# Patient Record
Sex: FEMALE | Race: WHITE | Employment: UNEMPLOYED | URBAN - METROPOLITAN AREA
[De-identification: names, ages, dates, MRNs, and addresses within clinical notes are randomized per-mention and may not be internally consistent; named-entity substitution may affect disease eponyms.]

---

## 2017-06-14 ENCOUNTER — APPOINTMENT (OUTPATIENT)
Dept: GENERAL RADIOLOGY | Age: 42
End: 2017-06-14
Attending: PHYSICIAN ASSISTANT
Payer: MEDICAID

## 2017-06-14 ENCOUNTER — HOSPITAL ENCOUNTER (EMERGENCY)
Age: 42
Discharge: HOME | End: 2017-06-14
Attending: EMERGENCY MEDICINE
Payer: MEDICAID

## 2017-06-14 VITALS
DIASTOLIC BLOOD PRESSURE: 71 MMHG | TEMPERATURE: 98.1 F | SYSTOLIC BLOOD PRESSURE: 131 MMHG | RESPIRATION RATE: 17 BRPM | HEART RATE: 84 BPM | OXYGEN SATURATION: 98 %

## 2017-06-14 VITALS
SYSTOLIC BLOOD PRESSURE: 117 MMHG | HEART RATE: 89 BPM | BODY MASS INDEX: 40.35 KG/M2 | TEMPERATURE: 98.3 F | RESPIRATION RATE: 16 BRPM | OXYGEN SATURATION: 98 % | DIASTOLIC BLOOD PRESSURE: 68 MMHG | WEIGHT: 250 LBS

## 2017-06-14 DIAGNOSIS — G89.4 CHRONIC PAIN SYNDROME: Primary | ICD-10-CM

## 2017-06-14 DIAGNOSIS — G89.29 OTHER CHRONIC PAIN: Primary | ICD-10-CM

## 2017-06-14 PROCEDURE — 74011250636 HC RX REV CODE- 250/636: Performed by: PHYSICIAN ASSISTANT

## 2017-06-14 PROCEDURE — 96372 THER/PROPH/DIAG INJ SC/IM: CPT | Performed by: EMERGENCY MEDICINE

## 2017-06-14 PROCEDURE — 99283 EMERGENCY DEPT VISIT LOW MDM: CPT | Performed by: EMERGENCY MEDICINE

## 2017-06-14 PROCEDURE — 74011250637 HC RX REV CODE- 250/637: Performed by: PHYSICIAN ASSISTANT

## 2017-06-14 RX ORDER — ONDANSETRON 4 MG/1
4 TABLET, ORALLY DISINTEGRATING ORAL
Status: COMPLETED | OUTPATIENT
Start: 2017-06-14 | End: 2017-06-14

## 2017-06-14 RX ORDER — MORPHINE SULFATE 4 MG/ML
8 INJECTION, SOLUTION INTRAMUSCULAR; INTRAVENOUS ONCE
Status: DISCONTINUED | OUTPATIENT
Start: 2017-06-14 | End: 2017-06-14 | Stop reason: SDUPTHER

## 2017-06-14 RX ORDER — MORPHINE SULFATE 4 MG/ML
8 INJECTION, SOLUTION INTRAMUSCULAR; INTRAVENOUS ONCE
Status: COMPLETED | OUTPATIENT
Start: 2017-06-14 | End: 2017-06-14

## 2017-06-14 RX ADMIN — ONDANSETRON 4 MG: 4 TABLET, ORALLY DISINTEGRATING ORAL at 18:01

## 2017-06-14 RX ADMIN — MORPHINE SULFATE 8 MG: 4 INJECTION, SOLUTION INTRAMUSCULAR; INTRAVENOUS at 18:05

## 2017-06-14 NOTE — ED TRIAGE NOTES
Pt reports history of chronic back pain due to her fibromyalgia. Pt reports attempting 10mg Lortab without relief.        Dhruv Rueda RN

## 2017-06-14 NOTE — DISCHARGE INSTRUCTIONS
Chronic Pain: Care Instructions  Your Care Instructions  Chronic pain is pain that lasts a long time (months or even years) and may or may not have a clear cause. It is different from acute pain, which usually does have a clear cause--like an injury or illness--and gets better over time. Chronic pain:  · Lasts over time but may vary from day to day. · Does not go away despite efforts to end it. · May disrupt your sleep and lead to fatigue. · May cause depression or anxiety. · May make your muscles tense, causing more pain. · Can disrupt your work, hobbies, home life, and relationships with friends and family. Chronic pain is a very real condition. It is not just in your head. Treatment can help and usually includes several methods used together, such as medicines, physical therapy, exercise, and other treatments. Learning how to relax and changing negative thought patterns can also help you cope. Chronic pain is complex. Taking an active role in your treatment will help you better manage your pain. Tell your doctor if you have trouble dealing with your pain. You may have to try several things before you find what works best for you. Follow-up care is a key part of your treatment and safety. Be sure to make and go to all appointments, and call your doctor if you are having problems. Its also a good idea to know your test results and keep a list of the medicines you take. How can you care for yourself at home? · Pace yourself. Break up large jobs into smaller tasks. Save harder tasks for days when you have less pain, or go back and forth between hard tasks and easier ones. Take rest breaks. · Relax, and reduce stress. Relaxation techniques such as deep breathing or meditation can help. · Keep moving. Gentle, daily exercise can help reduce pain over the long run. Try low- or no-impact exercises such as walking, swimming, and stationary biking. Do stretches to stay flexible.   · Try heat, cold packs, and massage. · Get enough sleep. Chronic pain can make you tired and drain your energy. Talk with your doctor if you have trouble sleeping because of pain. · Think positive. Your thoughts can affect your pain level. Do things that you enjoy to distract yourself when you have pain instead of focusing on the pain. See a movie, read a book, listen to music, or spend time with a friend. · If you think you are depressed, talk to your doctor about treatment. · Keep a daily pain diary. Record how your moods, thoughts, sleep patterns, activities, and medicine affect your pain. You may find that your pain is worse during or after certain activities or when you are feeling a certain emotion. Having a record of your pain can help you and your doctor find the best ways to treat your pain. · Take pain medicines exactly as directed. ¨ If the doctor gave you a prescription medicine for pain, take it as prescribed. ¨ If you are not taking a prescription pain medicine, ask your doctor if you can take an over-the-counter medicine. Reducing constipation caused by pain medicine  · Include fruits, vegetables, beans, and whole grains in your diet each day. These foods are high in fiber. · Drink plenty of fluids, enough so that your urine is light yellow or clear like water. If you have kidney, heart, or liver disease and have to limit fluids, talk with your doctor before you increase the amount of fluids you drink. · If your doctor recommends it, get more exercise. Walking is a good choice. Bit by bit, increase the amount you walk every day. Try for at least 30 minutes on most days of the week. · Schedule time each day for a bowel movement. A daily routine may help. Take your time and do not strain when having a bowel movement. When should you call for help? Call your doctor now or seek immediate medical care if:  · Your pain gets worse or is out of control.   · You feel down or blue, or you do not enjoy things like you once did. You may be depressed, which is common in people with chronic pain. Depression can be treated. · You have vomiting or cramps for more than 2 hours. Watch closely for changes in your health, and be sure to contact your doctor if:  · You cannot sleep because of pain. · You are very worried or anxious about your pain. · You have trouble taking your pain medicine. · You have any concerns about your pain medicine. · You have trouble with bowel movements, such as:  ¨ No bowel movement in 3 days. ¨ Blood in the anal area, in your stool, or on the toilet paper. ¨ Diarrhea for more than 24 hours. Where can you learn more? Go to http://felicia-hosea.info/. Enter N004 in the search box to learn more about \"Chronic Pain: Care Instructions. \"  Current as of: October 14, 2016  Content Version: 11.2  © 3354-7230 [employer]. Care instructions adapted under license by [employer] (which disclaims liability or warranty for this information). If you have questions about a medical condition or this instruction, always ask your healthcare professional. [place] [address] any warranty or liability for your use of this information. Learning About a Pain Diary  What is a pain diary? When you have pain, you may not be able to sleep. You may have trouble thinking. Pain can make it hard for you to do your day-to-day activities, such as go to work. A pain diary is a written record that helps you keep track of when you have pain, how bad it is, and whether your treatment is helping. Keeping a diary gives you clues about your pain--when it happens, what causes it, and what makes it better or worse. How do you use a pain diary? Use the pain diary to write down the time and date of your pain episodes. The diary also helps you identify the type of pain you are having by using a pain scale. The pain scale starts at 0 and ends at 10.  In this scale, 0 is no pain, and 10 is the worst pain you can imagine. The diary also helps you track:  · Any medicine you are taking for pain and how much. · Side effects of your pain medicine. · Anything you did, ate, or drank that might have made the pain better. · Anything you did, ate, or drank that might have made the pain worse. What are the benefits of using a pain diary? Used over time, a pain diary can help you and your doctor understand the kinds of things that make your pain better or worse. Here are some things to think about while you are using your pain diary:  · Where is the pain located? Is it throbbing, sharp, tingling, shooting, or burning? Is it constant, or does it come and go? · Does the pain change at different times of day? When? · Does the pain get worse before or after meals? · Does the pain get better or worse with activity? What kind of activity? · Does the pain keep you from falling asleep at night? Does pain wake you up in the night? How long do you keep a pain diary? Your doctor will have you track your pain using the diary for a week or more. Then the two of you will meet to talk about what you have written in the diary. Your doctor may adjust pain medicine or suggest other treatments to try. After these changes are made, you may use the pain diary for another week, or longer if your doctor suggests, to track your pain and whether the new treatment is helping. The diary is a tool that can help you and your doctor find out what works best to manage pain. You will use it as long as you both find it helpful. Where can you learn more? Go to http://felicia-hosea.info/. Enter X532 in the search box to learn more about \"Learning About a Pain Diary. \"  Current as of: October 14, 2016  Content Version: 11.2  © 6716-7559 [employer]. Care instructions adapted under license by [employer] (which disclaims liability or warranty for this information).  If you have questions about a medical condition or this instruction, always ask your healthcare professional. [place] [address] any warranty or liability for your use of this information.

## 2017-06-14 NOTE — ED PROVIDER NOTES
HPI Comments: Patient presents to the ER stating \"I hurt all over, its my fibromyalgia flared up\". States pain started increasing yesterday, she only has \"a couple\" Norco 10mg left. Feels exactly like previous fibromyalgia pain- worse in the lower back and into BL shoulders. Patient denies urinary/bowel incontinence or retention, no saddle anesthesia or sensorimotor deficits. Pain hurts worse when she moves, improves with Vaughn.  Denies new trauma. Patient states she is in between pain management doctors right now, has a referral for [facility] but is waiting to get an appointment. Is unsatisfied with her PCP at [facility] because they do not treat her fibromyalgia appropriately. States she has been on methadone and Suboxone in the past.    Recently seen by neurology on 6/7/17 for migraines by Dr. Marie Chery. Patient is a 39 y.o. female presenting with back pain. Back Pain    Pertinent negatives include no chest pain, no fever, no numbness, no abdominal pain, no dysuria and no pelvic pain. Past Medical History:   Diagnosis Date    Asthma     Other ill-defined conditions     fibra myalgia    Psychiatric disorder     depression/anxiety       Past Surgical History:   Procedure Laterality Date    HX GYN      laparoscopy times 4    HX HEENT      jaw         History reviewed. No pertinent family history. Social History     Social History    Marital status: SINGLE     Spouse name: N/A    Number of children: N/A    Years of education: N/A     Occupational History    Not on file. Social History Main Topics    Smoking status: Never Smoker    Smokeless tobacco: Not on file    Alcohol use Yes      Comment: occ    Drug use: Yes     Special: Opiates    Sexual activity: Not on file     Other Topics Concern    Not on file     Social History Narrative         ALLERGIES: Haldol [haloperidol lactate]; Pcn [penicillins]; Toradol [ketorolac tromethamine];  Vioxx [rofecoxib]; and Vistaril [hydroxyzine pamoate]    Review of Systems   Constitutional: Negative for chills, fatigue and fever. Respiratory: Negative for cough and shortness of breath. Cardiovascular: Negative for chest pain and palpitations. Gastrointestinal: Negative for abdominal pain and nausea. Genitourinary: Negative for dysuria and pelvic pain. Musculoskeletal: Positive for arthralgias and back pain. Negative for neck pain and neck stiffness. Skin: Positive for wound. Neurological: Negative for numbness. Vitals:    06/14/17 1538   BP: 121/79   Pulse: 95   Resp: 16   Temp: 98.1 °F (36.7 °C)   SpO2: 98%   Weight: 113.4 kg (250 lb)            Physical Exam   Constitutional: She is oriented to person, place, and time. She appears well-developed and well-nourished. Large white female in no acute distress, laying on her side on stretcher in ER. HENT:   Head: Normocephalic. Eyes: Conjunctivae and EOM are normal. Pupils are equal, round, and reactive to light. Neck: Normal range of motion. Neck supple. No midline tenderness, range of motion normal.  Upper trapezius muscle tenderness to palpation. Pulmonary/Chest: Effort normal and breath sounds normal. No respiratory distress. She has no wheezes. Abdominal: Soft. Bowel sounds are normal. She exhibits no distension. There is no tenderness. There is no rebound. Musculoskeletal: She exhibits tenderness. Thoracic back: She exhibits tenderness and pain. She exhibits normal range of motion, no bony tenderness, no swelling, no edema, no deformity and no laceration. Back:    Refuses SLR test due to pain. Can roll over on stretcher without assistance. Neurological: She is alert and oriented to person, place, and time. She has normal strength and normal reflexes. No cranial nerve deficit or sensory deficit. GCS eye subscore is 4. GCS verbal subscore is 5. GCS motor subscore is 6.    Reflex Scores:       Brachioradialis reflexes are 2+ on the right side and 2+ on the left side. Patellar reflexes are 2+ on the right side and 2+ on the left side. A&O x 3. CN 2-12 grossly intact   Nursing note and vitals reviewed. MDM  Number of Diagnoses or Management Options  Other chronic pain: new and requires workup  Diagnosis management comments: Fibromyalgia, drug-seeking behavior, UTI, chronic pain syndrome       Amount and/or Complexity of Data Reviewed  Clinical lab tests: ordered and reviewed  Tests in the radiology section of CPT®: ordered and reviewed    Risk of Complications, Morbidity, and/or Mortality  Presenting problems: moderate  Diagnostic procedures: moderate  Management options: moderate  General comments: We will assess UTI and lumbar spine for any reason for flareup to fibromyalgia. Patient I will give her pain medicine while in the ER and would not be prescribing any narcotic medication upon discharge. Patient Progress  Patient progress: improved    ED Course       Procedures      The patient was observed in the ED. Results Reviewed:  Pt refuses XL lumbar spine. Patient presents to the ER for pain control of \"flareup\" of fibromyalgia. No recent trauma, fall, no urinary symptoms. Patient will be getting IM morphine, discharged to follow-up with pain management. Reports feeling better. I discussed the results of all labs, procedures, radiographs, and treatments with the patient and available family. Treatment plan is agreed upon and the patient is ready for discharge. All voiced understanding of the discharge plan and medication instructions or changes as appropriate. Questions about treatment in the ED were answered. All were encouraged to return should symptoms worsen or new problems develop.

## 2017-06-14 NOTE — ROUTINE PROCESS
Spoke with patient concerning her complaint about her wait time and explained that the ER was was following their triage protocol and that after looking at the ER board no one had been pulled back from the waiting room that had come in after her.

## 2017-06-14 NOTE — Clinical Note
Ear pain has been treated in the ER today as a courtesy to you. The emergency room cannot appropriately treat your chronic pain. Urinalysis was unremarkable. Follow-up with your pain management clinic or family doctor as soon as able. Gentle exercis es and moving your body can aid in self management of chronic pain. Follow-up with mental health to discuss long-term coping mechanisms with your chronic pain. Return to the ER if worsening or new symptoms.

## 2017-06-15 NOTE — ED NOTES
Pt returns to ED for continued back pain/fibromyalgia. Pt states she will \"call \" if she does not receive something else for pain and see a physician. Informed patient she would have to wait to see physician in lobby as she had already been discharged.       aHny Jacob RN

## 2017-06-15 NOTE — DISCHARGE INSTRUCTIONS

## 2017-06-15 NOTE — ED PROVIDER NOTES
HPI Comments: 70-year-old female with history of fibromyalgia and chronic pain syndrome presents requesting pain medication. She states she has fibromyalgia flareup and has pain getting out of a chair. She was seen in the emergency department earlier today and get a shot of morphine. She is frustrated that she did not get a strong prescription for pain. She had refused x-ray of her back at that time. She has been on  Subutex therapy for at least one year, but states that she stopped last month because she needed  Norco to treat her \"tinnitus. \"  When questioned further about that \"tinnitus\" she states it is pain throughout her jaw and face and this was diagnosed by oral surgeon. I questioned whether she was referring to trigeminal neuralgia, but she continues to insist that she needs Norco for her tinnitus pain, but the Suboxone helped her fibromyalgia pain, and she was unable to take both. She now states that she is \"in between doctors\" and needs something else for pain. She continues to list other painful conditions such as a prior broken jaw, pelvic pain from D&C, etc.    The history is provided by the patient. Past Medical History:   Diagnosis Date    Asthma     Other ill-defined conditions     fibra myalgia    Psychiatric disorder     depression/anxiety       Past Surgical History:   Procedure Laterality Date    HX GYN      laparoscopy times 4    HX HEENT      jaw         History reviewed. No pertinent family history. Social History     Social History    Marital status: SINGLE     Spouse name: N/A    Number of children: N/A    Years of education: N/A     Occupational History    Not on file.      Social History Main Topics    Smoking status: Never Smoker    Smokeless tobacco: Not on file    Alcohol use Yes      Comment: occ    Drug use: Yes     Special: Opiates    Sexual activity: Not on file     Other Topics Concern    Not on file     Social History Narrative         ALLERGIES: Haldol [haloperidol lactate]; Pcn [penicillins]; Toradol [ketorolac tromethamine]; Vioxx [rofecoxib]; and Vistaril [hydroxyzine pamoate]    Review of Systems   Constitutional: Negative for fever. Musculoskeletal: Positive for arthralgias, back pain and myalgias. Negative for joint swelling. Skin: Negative for wound. Psychiatric/Behavioral: Negative for confusion. Vitals:    06/14/17 2030 06/14/17 2323   BP: 124/79 131/71   Pulse: 90 84   Resp: 16 17   Temp: 98.4 °F (36.9 °C) 98.1 °F (36.7 °C)   SpO2: 98% 98%            Physical Exam   Constitutional: She appears well-developed and well-nourished. No distress. HENT:   Head: Normocephalic and atraumatic. Eyes: Conjunctivae are normal. Pupils are equal, round, and reactive to light. Neck: Neck supple. Musculoskeletal: Normal range of motion. Neurological: She is alert. She exhibits normal muscle tone. Skin: Skin is warm and dry. Psychiatric: Her speech is slurred. She is slowed. Nursing note and vitals reviewed. MDM  Number of Diagnoses or Management Options  Chronic pain syndrome:   Diagnosis management comments: Parts of this document were created using dragon voice recognition software. The chart has been reviewed but errors may still be present. Slumped over in wheelchair upon my entering room. Appears sleepy. Continually itches her nose. Controlled substance report reveals 40 prescriptions from 7 prescribers and 5 pharmacies over the past yeasr. Most all prescriptions until March are for buprenorphine with  3 intermixed oxycodone/hydrocodone prescriptions. Most recently, she has had  3 narcotic prescriptions since April from various providers. Discussed that we could not provide her any further narcotic treatment for chronic pain. She has no new acute painful conditions. She does not appear to be in any significant pain. Vital signs stable. Patient became very frustrated stating she was going to librado us.   Patient escorted to the waiting room.     ED Course       Procedures

## 2019-05-23 PROBLEM — G89.4 CHRONIC PAIN SYNDROME: Status: ACTIVE | Noted: 2019-05-23

## 2019-05-23 PROBLEM — F41.9 ANXIETY: Status: ACTIVE | Noted: 2019-05-23

## 2019-05-23 PROBLEM — E66.9 OBESITY, UNSPECIFIED OBESITY SEVERITY, UNSPECIFIED OBESITY TYPE: Status: ACTIVE | Noted: 2019-05-23

## 2019-05-23 PROBLEM — F25.1 SCHIZOAFFECTIVE DISORDER, DEPRESSIVE TYPE (HCC): Status: ACTIVE | Noted: 2017-06-22

## 2019-05-23 PROBLEM — Z71.3 DIETARY COUNSELING: Status: ACTIVE | Noted: 2019-05-23

## 2019-05-23 PROBLEM — Z13.228 SCREENING FOR METABOLIC DISORDER: Status: ACTIVE | Noted: 2019-05-23

## 2019-05-23 PROBLEM — M79.7 FIBROMYALGIA: Status: ACTIVE | Noted: 2019-05-23

## 2019-05-23 PROBLEM — F99 PSYCHIATRIC DISORDER: Status: ACTIVE | Noted: 2019-05-23

## 2019-05-23 PROBLEM — Z11.3 SCREENING FOR STD (SEXUALLY TRANSMITTED DISEASE): Status: ACTIVE | Noted: 2019-05-23

## 2019-06-03 PROBLEM — Z12.31 SCREENING MAMMOGRAM, ENCOUNTER FOR: Status: ACTIVE | Noted: 2019-06-03

## 2019-06-03 PROBLEM — Z23 ENCOUNTER FOR IMMUNIZATION: Status: ACTIVE | Noted: 2019-06-03

## 2019-06-03 PROBLEM — Z00.00 PHYSICAL EXAM: Status: ACTIVE | Noted: 2019-06-03

## 2019-06-03 PROBLEM — E66.01 OBESITY, MORBID (HCC): Status: ACTIVE | Noted: 2019-06-03

## 2019-07-05 ENCOUNTER — HOSPITAL ENCOUNTER (OUTPATIENT)
Dept: MAMMOGRAPHY | Age: 44
Discharge: HOME | End: 2019-07-05
Attending: FAMILY MEDICINE

## 2019-07-05 DIAGNOSIS — Z12.31 SCREENING MAMMOGRAM, ENCOUNTER FOR: ICD-10-CM

## 2019-07-22 ENCOUNTER — HOSPITAL ENCOUNTER (OUTPATIENT)
Dept: MAMMOGRAPHY | Age: 44
Discharge: HOME | End: 2019-07-22
Attending: FAMILY MEDICINE
Payer: COMMERCIAL

## 2019-07-22 DIAGNOSIS — R92.8 ABNORMAL SCREENING MAMMOGRAM: ICD-10-CM

## 2019-07-22 PROCEDURE — 76642 ULTRASOUND BREAST LIMITED: CPT

## 2019-07-22 PROCEDURE — 77065 DX MAMMO INCL CAD UNI: CPT

## 2019-08-14 PROBLEM — E78.1 HYPERTRIGLYCERIDEMIA: Status: ACTIVE | Noted: 2019-08-14

## 2019-08-14 PROBLEM — Z86.19 HISTORY OF HEPATITIS C VIRUS INFECTION: Status: ACTIVE | Noted: 2019-08-14

## 2019-08-14 PROBLEM — B19.20 HEPATITIS C VIRUS INFECTION WITHOUT HEPATIC COMA: Status: ACTIVE | Noted: 2019-08-14

## 2019-08-14 PROBLEM — R03.0 ELEVATED BP WITHOUT DIAGNOSIS OF HYPERTENSION: Status: ACTIVE | Noted: 2019-08-14

## 2019-08-14 PROBLEM — E78.5 HYPERLIPIDEMIA: Status: ACTIVE | Noted: 2019-08-14

## 2019-09-20 PROBLEM — Z12.31 SCREENING MAMMOGRAM, ENCOUNTER FOR: Status: RESOLVED | Noted: 2019-06-03 | Resolved: 2019-09-20

## 2019-09-20 PROBLEM — Z23 ENCOUNTER FOR IMMUNIZATION: Status: RESOLVED | Noted: 2019-06-03 | Resolved: 2019-09-20

## 2020-03-20 ENCOUNTER — HOSPITAL ENCOUNTER (OUTPATIENT)
Dept: LAB | Age: 45
Discharge: HOME | End: 2020-03-20
Payer: COMMERCIAL

## 2020-03-20 DIAGNOSIS — E78.1 HYPERTRIGLYCERIDEMIA: ICD-10-CM

## 2020-03-20 DIAGNOSIS — M54.41 CHRONIC RIGHT-SIDED LOW BACK PAIN WITH RIGHT-SIDED SCIATICA: ICD-10-CM

## 2020-03-20 DIAGNOSIS — R73.9 ELEVATED BLOOD SUGAR: ICD-10-CM

## 2020-03-20 DIAGNOSIS — E78.5 HYPERLIPIDEMIA, UNSPECIFIED HYPERLIPIDEMIA TYPE: ICD-10-CM

## 2020-03-20 DIAGNOSIS — G89.29 CHRONIC RIGHT-SIDED LOW BACK PAIN WITH RIGHT-SIDED SCIATICA: ICD-10-CM

## 2020-03-20 DIAGNOSIS — M79.651 RIGHT THIGH PAIN: ICD-10-CM

## 2020-03-20 LAB
ALBUMIN SERPL-MCNC: 3 G/DL (ref 3.5–5)
ALBUMIN/GLOB SERPL: 0.7 {RATIO} (ref 1.2–3.5)
ALP SERPL-CCNC: 101 U/L (ref 50–136)
ALT SERPL-CCNC: 20 U/L (ref 12–65)
ANION GAP SERPL CALC-SCNC: 6 MMOL/L (ref 7–16)
AST SERPL-CCNC: 13 U/L (ref 15–37)
BILIRUB SERPL-MCNC: 0.2 MG/DL (ref 0.2–1.1)
BUN SERPL-MCNC: 11 MG/DL (ref 6–23)
CALCIUM SERPL-MCNC: 8.5 MG/DL (ref 8.3–10.4)
CHLORIDE SERPL-SCNC: 106 MMOL/L (ref 98–107)
CHOLEST SERPL-MCNC: 223 MG/DL
CO2 SERPL-SCNC: 25 MMOL/L (ref 21–32)
CREAT SERPL-MCNC: 0.83 MG/DL (ref 0.6–1)
EST. AVERAGE GLUCOSE BLD GHB EST-MCNC: 131 MG/DL
GLOBULIN SER CALC-MCNC: 4.4 G/DL (ref 2.3–3.5)
GLUCOSE SERPL-MCNC: 135 MG/DL (ref 65–100)
HBA1C MFR BLD: 6.2 %
HDLC SERPL-MCNC: 52 MG/DL (ref 40–60)
HDLC SERPL: 4.3 {RATIO}
LDLC SERPL CALC-MCNC: 137.8 MG/DL
LIPID PROFILE,FLP: ABNORMAL
POTASSIUM SERPL-SCNC: 3.4 MMOL/L (ref 3.5–5.1)
PROT SERPL-MCNC: 7.4 G/DL (ref 6.3–8.2)
SODIUM SERPL-SCNC: 137 MMOL/L (ref 136–145)
T4 FREE SERPL-MCNC: 0.9 NG/DL (ref 0.78–1.46)
TRIGL SERPL-MCNC: 166 MG/DL (ref 35–150)
TSH SERPL DL<=0.005 MIU/L-ACNC: 1.78 UIU/ML
VLDLC SERPL CALC-MCNC: 33.2 MG/DL (ref 6–23)

## 2020-03-20 PROCEDURE — 36415 COLL VENOUS BLD VENIPUNCTURE: CPT

## 2020-03-20 PROCEDURE — 83036 HEMOGLOBIN GLYCOSYLATED A1C: CPT

## 2020-03-20 PROCEDURE — 84443 ASSAY THYROID STIM HORMONE: CPT

## 2020-03-20 PROCEDURE — 84439 ASSAY OF FREE THYROXINE: CPT

## 2020-03-20 PROCEDURE — 80061 LIPID PANEL: CPT

## 2020-03-20 PROCEDURE — 80053 COMPREHEN METABOLIC PANEL: CPT

## 2020-03-24 NOTE — PROGRESS NOTES
Pt has    high cholesterol   borderlne diabetes and   low potassium    Ask pt to schedule telemedicine visit to discuss treatment options asap

## 2020-03-30 PROBLEM — R73.03 PREDIABETES: Status: ACTIVE | Noted: 2020-03-30

## 2020-03-30 PROBLEM — Z71.82 EXERCISE COUNSELING: Status: ACTIVE | Noted: 2020-03-30

## 2020-03-30 PROBLEM — E87.6 LOW SERUM POTASSIUM: Status: ACTIVE | Noted: 2020-03-30

## 2020-04-04 PROBLEM — R73.9 ELEVATED BLOOD SUGAR: Status: ACTIVE | Noted: 2020-04-04

## 2020-06-25 ENCOUNTER — HOSPITAL ENCOUNTER (OUTPATIENT)
Dept: LAB | Age: 45
Discharge: HOME | End: 2020-06-25
Payer: COMMERCIAL

## 2020-06-25 LAB
ALBUMIN SERPL-MCNC: 3 G/DL (ref 3.5–5)
ALBUMIN/GLOB SERPL: 0.7 {RATIO} (ref 1.2–3.5)
ALP SERPL-CCNC: 97 U/L (ref 50–136)
ALT SERPL-CCNC: 19 U/L (ref 12–65)
ANION GAP SERPL CALC-SCNC: 6 MMOL/L (ref 7–16)
AST SERPL-CCNC: 15 U/L (ref 15–37)
BILIRUB SERPL-MCNC: 0.3 MG/DL (ref 0.2–1.1)
BUN SERPL-MCNC: 7 MG/DL (ref 6–23)
CALCIUM SERPL-MCNC: 8.3 MG/DL (ref 8.3–10.4)
CHLORIDE SERPL-SCNC: 104 MMOL/L (ref 98–107)
CHOLEST SERPL-MCNC: 199 MG/DL
CO2 SERPL-SCNC: 27 MMOL/L (ref 21–32)
CREAT SERPL-MCNC: 0.74 MG/DL (ref 0.6–1)
EST. AVERAGE GLUCOSE BLD GHB EST-MCNC: 146 MG/DL
GLOBULIN SER CALC-MCNC: 4.2 G/DL (ref 2.3–3.5)
GLUCOSE SERPL-MCNC: 93 MG/DL (ref 65–100)
HBA1C MFR BLD: 6.7 %
HDLC SERPL-MCNC: 56 MG/DL (ref 40–60)
HDLC SERPL: 3.6 {RATIO}
LDLC SERPL CALC-MCNC: 121.8 MG/DL
LIPID PROFILE,FLP: ABNORMAL
POTASSIUM SERPL-SCNC: 3.5 MMOL/L (ref 3.5–5.1)
PROT SERPL-MCNC: 7.2 G/DL (ref 6.3–8.2)
SODIUM SERPL-SCNC: 137 MMOL/L (ref 136–145)
TRIGL SERPL-MCNC: 106 MG/DL (ref 35–150)
VLDLC SERPL CALC-MCNC: 21.2 MG/DL (ref 6–23)

## 2020-06-25 PROCEDURE — 83036 HEMOGLOBIN GLYCOSYLATED A1C: CPT

## 2020-06-25 PROCEDURE — 80053 COMPREHEN METABOLIC PANEL: CPT

## 2020-06-25 PROCEDURE — 80061 LIPID PANEL: CPT

## 2020-06-25 PROCEDURE — 36415 COLL VENOUS BLD VENIPUNCTURE: CPT

## 2020-06-25 NOTE — PROGRESS NOTES
Pt has   NEW onset DIABETES and cholesterol is high  Ask pt to set up appt with me tomorrow--to discuss treatment for diabetes and cholesterol

## 2020-06-26 PROBLEM — E11.65 UNCONTROLLED TYPE 2 DIABETES MELLITUS WITH HYPERGLYCEMIA (HCC): Status: ACTIVE | Noted: 2020-06-26

## 2020-08-25 PROBLEM — M51.36 DDD (DEGENERATIVE DISC DISEASE), LUMBAR: Status: ACTIVE | Noted: 2020-08-25

## 2020-08-25 PROBLEM — N94.6 DYSMENORRHEA: Status: ACTIVE | Noted: 2020-08-25

## 2020-08-25 PROBLEM — M54.41 ACUTE BILATERAL LOW BACK PAIN WITH RIGHT-SIDED SCIATICA: Status: ACTIVE | Noted: 2020-08-25

## 2020-11-12 PROBLEM — J45.20 MILD INTERMITTENT ASTHMA: Status: ACTIVE | Noted: 2020-11-12

## 2020-11-12 PROBLEM — J45.901 EXACERBATION OF PERSISTENT ASTHMA: Status: ACTIVE | Noted: 2020-11-12

## 2021-03-21 PROBLEM — Z12.31 SCREENING MAMMOGRAM, ENCOUNTER FOR: Status: RESOLVED | Noted: 2019-06-03 | Resolved: 2021-03-21

## 2021-04-23 PROBLEM — E11.8 CONTROLLED TYPE 2 DIABETES MELLITUS WITH COMPLICATION, WITHOUT LONG-TERM CURRENT USE OF INSULIN (HCC): Status: ACTIVE | Noted: 2021-04-23

## 2021-04-23 PROBLEM — J45.901 MODERATE ASTHMA WITH EXACERBATION: Status: ACTIVE | Noted: 2021-04-23

## 2021-04-23 PROBLEM — J40 BRONCHITIS: Status: ACTIVE | Noted: 2021-04-23

## 2021-04-23 PROBLEM — J32.9 SINUSITIS: Status: ACTIVE | Noted: 2021-04-23

## 2021-04-23 PROBLEM — D50.8 IRON DEFICIENCY ANEMIA SECONDARY TO INADEQUATE DIETARY IRON INTAKE: Status: ACTIVE | Noted: 2021-04-23

## 2021-05-13 PROBLEM — M54.31 RIGHT SIDED SCIATICA: Status: ACTIVE | Noted: 2021-05-13

## 2021-05-13 PROBLEM — D64.9 ANEMIA: Status: ACTIVE | Noted: 2021-05-13

## 2021-07-14 PROBLEM — Z53.20 COLONOSCOPY REFUSED: Status: ACTIVE | Noted: 2021-07-14

## 2022-01-06 PROBLEM — J45.30 CONTROLLED MILD PERSISTENT ASTHMA: Status: ACTIVE | Noted: 2022-01-06

## 2022-01-06 PROBLEM — R53.83 FATIGUE: Status: ACTIVE | Noted: 2022-01-06

## 2022-01-06 PROBLEM — N92.0 MENORRHAGIA WITH REGULAR CYCLE: Status: ACTIVE | Noted: 2022-01-06

## 2022-01-06 PROBLEM — R06.83 SNORING: Status: ACTIVE | Noted: 2022-01-06

## 2022-03-18 PROBLEM — M51.36 DDD (DEGENERATIVE DISC DISEASE), LUMBAR: Status: ACTIVE | Noted: 2020-08-25

## 2022-03-18 PROBLEM — F99 PSYCHIATRIC DISORDER: Status: ACTIVE | Noted: 2019-05-23

## 2022-03-18 PROBLEM — Z86.19 HISTORY OF HEPATITIS C VIRUS INFECTION: Status: ACTIVE | Noted: 2019-08-14

## 2022-03-18 PROBLEM — M54.31 RIGHT SIDED SCIATICA: Status: ACTIVE | Noted: 2021-05-13

## 2022-03-18 PROBLEM — F41.9 ANXIETY: Status: ACTIVE | Noted: 2019-05-23

## 2022-03-18 PROBLEM — N92.0 MENORRHAGIA WITH REGULAR CYCLE: Status: ACTIVE | Noted: 2022-01-06

## 2022-03-18 PROBLEM — M51.369 DDD (DEGENERATIVE DISC DISEASE), LUMBAR: Status: ACTIVE | Noted: 2020-08-25

## 2022-03-18 PROBLEM — M54.41 ACUTE BILATERAL LOW BACK PAIN WITH RIGHT-SIDED SCIATICA: Status: ACTIVE | Noted: 2020-08-25

## 2022-03-18 PROBLEM — R03.0 ELEVATED BP WITHOUT DIAGNOSIS OF HYPERTENSION: Status: ACTIVE | Noted: 2019-08-14

## 2022-03-18 PROBLEM — R73.9 ELEVATED BLOOD SUGAR: Status: ACTIVE | Noted: 2020-04-04

## 2022-03-18 PROBLEM — M54.41 CHRONIC RIGHT-SIDED LOW BACK PAIN WITH RIGHT-SIDED SCIATICA: Status: ACTIVE | Noted: 2020-03-20

## 2022-03-18 PROBLEM — Z71.82 EXERCISE COUNSELING: Status: ACTIVE | Noted: 2020-03-30

## 2022-03-18 PROBLEM — G89.29 CHRONIC RIGHT-SIDED LOW BACK PAIN WITH RIGHT-SIDED SCIATICA: Status: ACTIVE | Noted: 2020-03-20

## 2022-03-18 PROBLEM — D50.8 IRON DEFICIENCY ANEMIA SECONDARY TO INADEQUATE DIETARY IRON INTAKE: Status: ACTIVE | Noted: 2021-04-23

## 2022-03-19 PROBLEM — Z13.228 SCREENING FOR METABOLIC DISORDER: Status: ACTIVE | Noted: 2019-05-23

## 2022-03-19 PROBLEM — E11.8 CONTROLLED TYPE 2 DIABETES MELLITUS WITH COMPLICATION, WITHOUT LONG-TERM CURRENT USE OF INSULIN (HCC): Status: ACTIVE | Noted: 2021-04-23

## 2022-03-19 PROBLEM — R73.03 PREDIABETES: Status: ACTIVE | Noted: 2020-03-30

## 2022-03-19 PROBLEM — F25.1 SCHIZOAFFECTIVE DISORDER, DEPRESSIVE TYPE (HCC): Status: ACTIVE | Noted: 2017-06-22

## 2022-03-19 PROBLEM — E78.1 HYPERTRIGLYCERIDEMIA: Status: ACTIVE | Noted: 2019-08-14

## 2022-03-19 PROBLEM — J45.901 MODERATE ASTHMA WITH EXACERBATION: Status: ACTIVE | Noted: 2021-04-23

## 2022-03-19 PROBLEM — M79.7 FIBROMYALGIA: Status: ACTIVE | Noted: 2019-05-23

## 2022-03-19 PROBLEM — E78.5 HYPERLIPIDEMIA: Status: ACTIVE | Noted: 2019-08-14

## 2022-03-19 PROBLEM — E66.9 OBESITY, UNSPECIFIED OBESITY SEVERITY, UNSPECIFIED OBESITY TYPE: Status: ACTIVE | Noted: 2019-05-23

## 2022-03-19 PROBLEM — J40 BRONCHITIS: Status: ACTIVE | Noted: 2021-04-23

## 2022-03-19 PROBLEM — R06.83 SNORING: Status: ACTIVE | Noted: 2022-01-06

## 2022-03-19 PROBLEM — Z00.00 PHYSICAL EXAM: Status: ACTIVE | Noted: 2019-06-03

## 2022-03-19 PROBLEM — J45.30 CONTROLLED MILD PERSISTENT ASTHMA: Status: ACTIVE | Noted: 2022-01-06

## 2022-03-19 PROBLEM — B19.20 HEPATITIS C VIRUS INFECTION WITHOUT HEPATIC COMA: Status: ACTIVE | Noted: 2019-08-14

## 2022-03-19 PROBLEM — J32.9 SINUSITIS: Status: ACTIVE | Noted: 2021-04-23

## 2022-03-19 PROBLEM — M79.651 RIGHT THIGH PAIN: Status: ACTIVE | Noted: 2020-03-20

## 2022-03-19 PROBLEM — Z11.3 SCREENING FOR STD (SEXUALLY TRANSMITTED DISEASE): Status: ACTIVE | Noted: 2019-05-23

## 2022-03-19 PROBLEM — J45.901 EXACERBATION OF PERSISTENT ASTHMA: Status: ACTIVE | Noted: 2020-11-12

## 2022-03-19 PROBLEM — E87.6 LOW SERUM POTASSIUM: Status: ACTIVE | Noted: 2020-03-30

## 2022-03-19 PROBLEM — E11.65 UNCONTROLLED TYPE 2 DIABETES MELLITUS WITH HYPERGLYCEMIA (HCC): Status: ACTIVE | Noted: 2020-06-26

## 2022-03-19 PROBLEM — N94.6 DYSMENORRHEA: Status: ACTIVE | Noted: 2020-08-25

## 2022-03-19 PROBLEM — Z71.3 DIETARY COUNSELING: Status: ACTIVE | Noted: 2019-05-23

## 2022-03-19 PROBLEM — Z53.20 COLONOSCOPY REFUSED: Status: ACTIVE | Noted: 2021-07-14

## 2022-03-19 PROBLEM — G89.4 CHRONIC PAIN SYNDROME: Status: ACTIVE | Noted: 2019-05-23

## 2022-03-20 PROBLEM — D64.9 ANEMIA: Status: ACTIVE | Noted: 2021-05-13

## 2022-03-20 PROBLEM — R53.83 FATIGUE: Status: ACTIVE | Noted: 2022-01-06

## 2022-03-20 PROBLEM — J45.20 MILD INTERMITTENT ASTHMA: Status: ACTIVE | Noted: 2020-11-12

## 2022-03-20 PROBLEM — E66.01 OBESITY, MORBID (HCC): Status: ACTIVE | Noted: 2019-06-03

## 2022-04-18 PROBLEM — M79.10 MYALGIA: Status: ACTIVE | Noted: 2022-04-18

## 2022-04-18 PROBLEM — R07.9 CHEST PAIN: Status: ACTIVE | Noted: 2022-04-18

## 2022-04-18 PROBLEM — I10 PRIMARY HYPERTENSION: Status: ACTIVE | Noted: 2022-04-18

## 2022-07-23 DIAGNOSIS — E11.8 TYPE 2 DIABETES MELLITUS WITH UNSPECIFIED COMPLICATIONS (HCC): ICD-10-CM

## 2022-07-23 DIAGNOSIS — E78.5 HYPERLIPIDEMIA, UNSPECIFIED: ICD-10-CM

## 2022-08-19 ENCOUNTER — OFFICE VISIT (OUTPATIENT)
Dept: PRIMARY CARE CLINIC | Facility: CLINIC | Age: 47
End: 2022-08-19
Payer: COMMERCIAL

## 2022-08-19 VITALS
OXYGEN SATURATION: 97 % | BODY MASS INDEX: 48.65 KG/M2 | HEIGHT: 65 IN | SYSTOLIC BLOOD PRESSURE: 136 MMHG | WEIGHT: 292 LBS | DIASTOLIC BLOOD PRESSURE: 87 MMHG | HEART RATE: 109 BPM | TEMPERATURE: 97 F

## 2022-08-19 DIAGNOSIS — R11.0 CHRONIC NAUSEA: ICD-10-CM

## 2022-08-19 DIAGNOSIS — J45.30 MILD PERSISTENT ASTHMA, UNCOMPLICATED: ICD-10-CM

## 2022-08-19 DIAGNOSIS — E11.8 TYPE 2 DIABETES MELLITUS WITH UNSPECIFIED COMPLICATIONS (HCC): Primary | ICD-10-CM

## 2022-08-19 DIAGNOSIS — E78.5 HYPERLIPIDEMIA, UNSPECIFIED HYPERLIPIDEMIA TYPE: ICD-10-CM

## 2022-08-19 DIAGNOSIS — I10 ESSENTIAL (PRIMARY) HYPERTENSION: ICD-10-CM

## 2022-08-19 DIAGNOSIS — Z53.20 COLONOSCOPY REFUSED: ICD-10-CM

## 2022-08-19 DIAGNOSIS — E66.01 MORBID (SEVERE) OBESITY DUE TO EXCESS CALORIES (HCC): ICD-10-CM

## 2022-08-19 DIAGNOSIS — Z71.3 DIETARY COUNSELING AND SURVEILLANCE: ICD-10-CM

## 2022-08-19 PROCEDURE — 3044F HG A1C LEVEL LT 7.0%: CPT | Performed by: FAMILY MEDICINE

## 2022-08-19 PROCEDURE — 99214 OFFICE O/P EST MOD 30 MIN: CPT | Performed by: FAMILY MEDICINE

## 2022-08-19 RX ORDER — METFORMIN HYDROCHLORIDE 500 MG/1
500 TABLET, EXTENDED RELEASE ORAL DAILY
Qty: 90 TABLET | Refills: 0 | Status: SHIPPED | OUTPATIENT
Start: 2022-08-19 | End: 2022-11-03 | Stop reason: SDUPTHER

## 2022-08-19 RX ORDER — ONDANSETRON 4 MG/1
4 TABLET, ORALLY DISINTEGRATING ORAL 3 TIMES DAILY PRN
Qty: 21 TABLET | Refills: 0 | Status: SHIPPED | OUTPATIENT
Start: 2022-08-19 | End: 2022-09-09

## 2022-08-19 RX ORDER — LISINOPRIL 2.5 MG/1
2.5 TABLET ORAL DAILY
Qty: 90 TABLET | Refills: 0 | Status: SHIPPED | OUTPATIENT
Start: 2022-08-19

## 2022-08-19 RX ORDER — ATORVASTATIN CALCIUM 10 MG/1
10 TABLET, FILM COATED ORAL DAILY
Qty: 90 TABLET | Refills: 0 | Status: SHIPPED | OUTPATIENT
Start: 2022-08-19

## 2022-08-19 SDOH — ECONOMIC STABILITY: FOOD INSECURITY: WITHIN THE PAST 12 MONTHS, YOU WORRIED THAT YOUR FOOD WOULD RUN OUT BEFORE YOU GOT MONEY TO BUY MORE.: NEVER TRUE

## 2022-08-19 SDOH — ECONOMIC STABILITY: TRANSPORTATION INSECURITY
IN THE PAST 12 MONTHS, HAS LACK OF TRANSPORTATION KEPT YOU FROM MEETINGS, WORK, OR FROM GETTING THINGS NEEDED FOR DAILY LIVING?: NO

## 2022-08-19 SDOH — ECONOMIC STABILITY: TRANSPORTATION INSECURITY
IN THE PAST 12 MONTHS, HAS THE LACK OF TRANSPORTATION KEPT YOU FROM MEDICAL APPOINTMENTS OR FROM GETTING MEDICATIONS?: NO

## 2022-08-19 SDOH — ECONOMIC STABILITY: FOOD INSECURITY: WITHIN THE PAST 12 MONTHS, THE FOOD YOU BOUGHT JUST DIDN'T LAST AND YOU DIDN'T HAVE MONEY TO GET MORE.: NEVER TRUE

## 2022-08-19 ASSESSMENT — PATIENT HEALTH QUESTIONNAIRE - PHQ9
1. LITTLE INTEREST OR PLEASURE IN DOING THINGS: 1
SUM OF ALL RESPONSES TO PHQ QUESTIONS 1-9: 2
SUM OF ALL RESPONSES TO PHQ QUESTIONS 1-9: 2
2. FEELING DOWN, DEPRESSED OR HOPELESS: 1
SUM OF ALL RESPONSES TO PHQ QUESTIONS 1-9: 2
SUM OF ALL RESPONSES TO PHQ QUESTIONS 1-9: 2
SUM OF ALL RESPONSES TO PHQ9 QUESTIONS 1 & 2: 2

## 2022-08-19 ASSESSMENT — SOCIAL DETERMINANTS OF HEALTH (SDOH): HOW HARD IS IT FOR YOU TO PAY FOR THE VERY BASICS LIKE FOOD, HOUSING, MEDICAL CARE, AND HEATING?: NOT HARD AT ALL

## 2022-08-19 ASSESSMENT — LIFESTYLE VARIABLES
HOW OFTEN DO YOU HAVE A DRINK CONTAINING ALCOHOL: NEVER
HOW MANY STANDARD DRINKS CONTAINING ALCOHOL DO YOU HAVE ON A TYPICAL DAY: PATIENT DOES NOT DRINK

## 2022-08-20 NOTE — PROGRESS NOTES
Eye exam 9/2021 as per pt  Pt refused colonoscopy/pap/sleep study in 1/2022      [address] [facility], [facility] [address]  Office : [phone]  Fax : [phone]          Subjective: The patient is a 55 y.o. female  who presents for f/u on multiple chronic medical conditions-good compliance with medications-pt here to get routeine labs and need refill on meds. no c  Psychiatric d/o-sees specialist-- anxiety  -on and off-pt to f/u with special;ist soon  diabetes -pts blood sugar stable on med  pts diet/exercise poor  Hypertension-stable on med  Hyperlipidemia--pts on low carb diet and low fat diet -stable on med  Gerd -stable on diet /med  Chronic lowback pain-stable now--on and off exacerbation-pt was recently seen in ER for hthe same and right thigh pain-x-ray were normal  Pt sees pain clinic for pain meds-on suboxone-      Patient Active Problem List   Diagnosis Code    Schizoaffective disorder, depressive type ([address] F25.1    Psychiatric disorder F99    Fibromyalgia M79.7    Chronic pain syndrome G89.4    Screening for metabolic disorder L46.550    Screening for STD (sexually transmitted disease) Z11.3    Dietary counseling Z71.3    Obesity, unspecified obesity severity, unspecified obesity type E66.9    Anxiety F41.9    Obesity, morbid ([address] E66.01    Physical exam Z00.00    Hepatitis C virus infection without hepatic coma B19.20    Hyperlipidemia E78.5    Hypertriglyceridemia E78.1    Elevated BP without diagnosis of hypertension R03.0    History of hepatitis C virus infection Z86.19    Right thigh pain M79.651    Chronic right-sided low back pain with right-sided sciatica M54.41, G89.29    Low serum potassium E87.6    Prediabetes R73.03    Exercise counseling Z71.82    Elevated blood sugar R73.9    Uncontrolled type 2 diabetes mellitus with hyperglycemia (HCC) E11.65    Acute bilateral low back pain with right-sided sciatica M54.41    DDD (degenerative disc disease), lumbar M51.36    Dysmenorrhea N94.6    Exacerbation of persistent asthma J45.901    Mild intermittent asthma J45.20    Bronchitis J40    Sinusitis J32.9    Moderate asthma with exacerbation J45.901    Iron deficiency anemia secondary to inadequate dietary iron intake D50.8    Controlled type 2 diabetes mellitus with complication, without long-term current use of insulin (HCC) E11.8    Anemia D64.9    Right sided sciatica M54.31    Colonoscopy refused Z53.20    Controlled mild persistent asthma J45.30    Menorrhagia with regular cycle N92.0    Snoring R06.83    Fatigue R53.83    Myalgia M79.10    Chest pain R07.9    Primary hypertension I10       Past Medical History:   Diagnosis Date    Asthma     Chronic right-sided low back pain with right-sided sciatica 3/20/2020    DDD (degenerative disc disease), lumbar 8/25/2020    Hepatitis C virus infection without hepatic coma     Hepatitis C virus infection without hepatic coma     Hepatitis C virus infection without hepatic coma     History of hepatitis C virus infection     History of hepatitis C virus infection     History of hepatitis C virus infection     Other ill-defined conditions(799.89)     fibra myalgia    Primary hypertension 4/18/2022    Psychiatric disorder     depression/anxiety    Uncontrolled type 2 diabetes mellitus with hyperglycemia ([facility] [address] 6/26/2020       Past Surgical History:   Procedure Laterality Date    HX GYN      laparoscopy times 4    HX HEENT      jaw       Social History     Socioeconomic History    Marital status: SINGLE     Spouse name: Not on file    Number of children: Not on file    Years of education: Not on file    Highest education level: Not on file   Occupational History    Not on file   Tobacco Use    Smoking status: Never Smoker    Smokeless tobacco: Never Used   Substance and Sexual Activity    Alcohol use: Yes     Comment: occ    Drug use: Yes     Types: Opiates    Sexual activity: Not on file   Other Topics Concern    Not on file Social History Narrative    Not on file     Social Determinants of Health     Financial Resource Strain: Low Risk     Difficulty of Paying Living Expenses: Not hard at all   Food Insecurity: No Food Insecurity    Worried About [address] in the Last Year: Never true    [address] in the Last Year: Never true   Transportation Needs:     Lack of Transportation (Medical): Not on file    Lack of Transportation (Non-Medical): Not on file   Physical Activity:     Days of Exercise per Week: Not on file    Minutes of Exercise per Session: Not on file   Stress:     Feeling of Stress : Not on file   Social Connections:     Frequency of Communication with Friends and Family: Not on file    Frequency of Social Gatherings with Friends and Family: Not on file    Attends Sikhism Services: Not on file    Active Member of Clubs or Organizations: Not on file    Attends Club or Organization Meetings: Not on file    Marital Status: Not on file   Intimate Partner Violence:     Fear of Current or Ex-Partner: Not on file    Emotionally Abused: Not on file    Physically Abused: Not on file    Sexually Abused: Not on file   Housing Stability:     Unable to Pay for Housing in the Last Year: Not on file    Number of [place] in the Last Year: Not on file    Unstable Housing in the Last Year: Not on file       Allergies   Allergen Reactions    Haldol [Haloperidol Lactate] Rash    Pcn [Penicillins] Nausea and Vomiting    Toradol [Ketorolac Tromethamine] Other (comments)     \"it made me feel really worse\"    Vioxx [Rofecoxib] Palpitations    Vistaril [Hydroxyzine Pamoate] Other (comments)     \"just makes me feel worse\"       Current Outpatient Medications   Medication Sig Dispense Refill    atorvastatin (LIPITOR) 10 mg tablet Take 1 Tablet by mouth daily. PM 90 Tablet 0    lisinopriL (PRINIVIL, ZESTRIL) 2.5 mg tablet Take 1 Tablet by mouth daily.  90 Tablet 0    metFORMIN ER (GLUCOPHAGE XR) 500 mg tablet Take 1 Tablet by mouth daily. 90 Tablet 0    methocarbamoL (ROBAXIN) 500 mg tablet Take 1 Tablet by mouth two (2) times daily as needed for Muscle Spasm(s). 20 Tablet 0    ergocalciferol (ERGOCALCIFEROL) 1,250 mcg (50,000 unit) capsule Take 1 Capsule by mouth every seven (7) days. 4 Capsule 3    fluticasone furoate-vilanteroL (BREO ELLIPTA) 100-25 mcg/dose inhaler Take 1 Puff by inhalation daily. 1 Each 5    albuterol (PROVENTIL HFA, VENTOLIN HFA, PROAIR HFA) 90 mcg/actuation inhaler Take 2 Puffs by inhalation every four (4) hours as needed for Wheezing. 1 Each 5    FLUoxetine (PROZAC) 20 mg capsule Take 60 mg by mouth daily. ARIPiprazole (ABILIFY) 20 mg tablet Take 20 mg by mouth daily. Review of Systems   Constitutional: fatigue   HENT: Negative. Eyes: Negative. Respiratory: Negative. Cardiovascular: Negative. Gastrointestinal: Negative. Genitourinary: Negative. Musculoskeletal: Positive for myalgias. Skin: Negative. Neurological: Negative. Endo/Heme/Allergies: Negative. Psychiatric/Behavioral: Positive for depression. The patient is nervous/anxious. Objective:    Visit Vitals  BP (!) 132/90 (BP 1 Location: Left upper arm, BP Patient Position: Sitting, BP Cuff Size: Adult)   Pulse (!) 108   Temp 97.7 °F (36.5 °C) (Temporal)   Ht 5' 6\" (1.676 m)   Wt 298 lb 3.2 oz (135.3 kg)   LMP 04/15/2022   SpO2 96%   BMI 48.13 kg/m²       Physical Exam   Constitutional: She is oriented to person, place, and time. She appears well-developed. Obese     HENT:   Head: Normocephalic and atraumatic. Right Ear: External ear normal.   Left Ear: External ear normal.   Nose: Nose normal.   Mouth/Throat: Oropharynx is clear and moist.   Eyes: Pupils are equal, round, and reactive to light. Conjunctivae and EOM are normal.   Neck: Normal range of motion. Neck supple. Cardiovascular: Normal rate, regular rhythm, normal heart sounds and intact distal pulses.    Pulmonary/Chest: Effort normal and breath sounds normal.   Abdominal: Soft. Bowel sounds are normal.   Musculoskeletal: Normal range of motion. Neurological: She is alert and oriented to person, place, and time. She has normal reflexes. Skin: Skin is warm and dry. Psychiatric: She has a normal mood and affect. Her behavior is normal. Judgment and thought content normal.         . RESULTRCNTNC(15W    ASSESSMENT/PLAN:    1. Type 2 diabetes mellitus with unspecified complications (HCC)  -     metFORMIN (GLUCOPHAGE-XR) 500 MG extended release tablet; Take 1 tablet by mouth daily, Disp-90 tablet, R-0Normal  -     Comprehensive Metabolic Panel; Future  -     Microalbumin / Creatinine Urine Ratio; Future  -     Hemoglobin A1C; Future  -     Lipid Panel; Future  2. Hyperlipidemia, unspecified hyperlipidemia type  Overview:  Diet controlled  low fat diet      Orders:  -     atorvastatin (LIPITOR) 10 MG tablet; Take 1 tablet by mouth daily, Disp-90 tablet, R-0Normal  -     Comprehensive Metabolic Panel; Future  -     Microalbumin / Creatinine Urine Ratio; Future  -     Hemoglobin A1C; Future  -     Lipid Panel; Future  3. Essential (primary) hypertension  -     lisinopril (PRINIVIL;ZESTRIL) 2.5 MG tablet; Take 1 tablet by mouth daily, Disp-90 tablet, R-0Normal  -     Comprehensive Metabolic Panel; Future  -     Microalbumin / Creatinine Urine Ratio; Future  -     Hemoglobin A1C; Future  -     Lipid Panel; Future  4. Mild persistent asthma, uncomplicated  -     Comprehensive Metabolic Panel; Future  -     Microalbumin / Creatinine Urine Ratio; Future  -     Hemoglobin A1C; Future  -     Lipid Panel; Future  5. Morbid (severe) obesity due to excess calories ([facility] [address]  -     Comprehensive Metabolic Panel; Future  -     Microalbumin / Creatinine Urine Ratio; Future  -     Hemoglobin A1C; Future  -     Lipid Panel; Future  6. Dietary counseling and surveillance  -     Comprehensive Metabolic Panel; Future  -     Microalbumin / Creatinine Urine Ratio;  Future  - Hemoglobin A1C; Future  -     Lipid Panel; Future  7. Colonoscopy refused  Overview:  7/2021      Orders:  -     Comprehensive Metabolic Panel; Future  -     Microalbumin / Creatinine Urine Ratio; Future  -     Hemoglobin A1C; Future  -     Lipid Panel; Future  8. Chronic nausea  -     ondansetron (ZOFRAN-ODT) 4 MG disintegrating tablet;  Take 1 tablet by mouth 3 times daily as needed for Nausea or Vomiting, Disp-21 tablet, R-0Normal   Orders Placed This Encounter   Procedures    Comprehensive Metabolic Panel     Standing Status:   Future     Standing Expiration Date:   8/19/2023    Microalbumin / Creatinine Urine Ratio     Standing Status:   Future     Standing Expiration Date:   8/19/2023    Hemoglobin A1C     Standing Status:   Future     Standing Expiration Date:   8/19/2023    Lipid Panel     Standing Status:   Future     Standing Expiration Date:   8/19/2023     Order Specific Question:   Is Patient Fasting?/# of Hours     Answer:   0      Orders Placed This Encounter   Medications    metFORMIN (GLUCOPHAGE-XR) 500 MG extended release tablet     Sig: Take 1 tablet by mouth daily     Dispense:  90 tablet     Refill:  0    lisinopril (PRINIVIL;ZESTRIL) 2.5 MG tablet     Sig: Take 1 tablet by mouth daily     Dispense:  90 tablet     Refill:  0    atorvastatin (LIPITOR) 10 MG tablet     Sig: Take 1 tablet by mouth daily     Dispense:  90 tablet     Refill:  0    ondansetron (ZOFRAN-ODT) 4 MG disintegrating tablet     Sig: Take 1 tablet by mouth 3 times daily as needed for Nausea or Vomiting     Dispense:  21 tablet     Refill:  0        Results for orders placed or performed in visit on 01/06/22   CBC W/O DIFF   Result Value Ref Range    WBC 8.6 3.4 - 10.8 x10E3/uL    RBC 4.79 3.77 - 5.28 x10E6/uL    HGB 11.8 11.1 - 15.9 g/dL    HCT 38.8 34.0 - 46.6 %    MCV 81 79 - 97 fL    MCH 24.6 (L) 26.6 - 33.0 pg    MCHC 30.4 (L) 31.5 - 35.7 g/dL    RDW 13.4 11.7 - 15.4 %    PLATELET 055 880 - 178 Q71F1/SR   METABOLIC PANEL, COMPREHENSIVE   Result Value Ref Range    Glucose 144 (H) 65 - 99 mg/dL    BUN 9 6 - 24 mg/dL    Creatinine 0.81 0.57 - 1.00 mg/dL    GFR est non-AA 87 >59 mL/min/1.73    GFR est  >59 mL/min/1.73    BUN/Creatinine ratio 11 9 - 23    Sodium 142 134 - 144 mmol/L    Potassium 4.2 3.5 - 5.2 mmol/L    Chloride 98 96 - 106 mmol/L    CO2 29 20 - 29 mmol/L    Calcium 9.4 8.7 - 10.2 mg/dL    Protein, total 6.9 6.0 - 8.5 g/dL    Albumin 3.9 3.8 - 4.8 g/dL    GLOBULIN, TOTAL 3.0 1.5 - 4.5 g/dL    A-G Ratio 1.3 1.2 - 2.2    Bilirubin, total 0.3 0.0 - 1.2 mg/dL    Alk. phosphatase 118 44 - 121 IU/L    AST (SGOT) 20 0 - 40 IU/L    ALT (SGPT) 22 0 - 32 IU/L   HEMOGLOBIN A1C W/O EAG   Result Value Ref Range    Hemoglobin A1c 6.8 (H) 4.8 - 5.6 %   LIPID PANEL   Result Value Ref Range    Cholesterol, total 205 (H) 100 - 199 mg/dL    Triglyceride 200 (H) 0 - 149 mg/dL    HDL Cholesterol 53 >39 mg/dL    VLDL, calculated 35 5 - 40 mg/dL    LDL, calculated 117 (H) 0 - 99 mg/dL   T4, FREE   Result Value Ref Range    T4, Free 1.04 0.82 - 1.77 ng/dL   TSH 3RD GENERATION   Result Value Ref Range    TSH 3.650 0.450 - 4.500 uIU/mL   ANTINUCLEAR ANTIBODIES, IFA   Result Value Ref Range    Antinuclear Abs, IFA Negative    RHEUMATOID FACTOR, QL   Result Value Ref Range    Rheumatoid factor <10.0 <14.0 IU/mL   IRON   Result Value Ref Range    Iron 45 27 - 159 ug/dL   VITAMIN D, 25 HYDROXY   Result Value Ref Range    VITAMIN D, 25-HYDROXY 12.2 (L) 30.0 - 100.0 ng/mL     We discussed the expected course, resolution and complications of the diagnosis(es) in detail. Medication risks, benefits, costs, interactions, and alternatives were discussed as indicated. I advised her to contact the office if her condition worsens, changes or fails to improve as anticipated. She expressed understanding with the diagnosis(es) and plan. Follow-up and Dispositions    Return in about 3 months  physical/pap         Yvonne Taveras MD

## 2022-08-25 RX ORDER — LISINOPRIL 2.5 MG/1
TABLET ORAL
Qty: 90 TABLET | OUTPATIENT
Start: 2022-08-25

## 2022-08-25 RX ORDER — ATORVASTATIN CALCIUM 10 MG/1
TABLET, FILM COATED ORAL
Qty: 90 TABLET | OUTPATIENT
Start: 2022-08-25

## 2022-08-25 RX ORDER — METFORMIN HYDROCHLORIDE 500 MG/1
TABLET, EXTENDED RELEASE ORAL
Qty: 90 TABLET | OUTPATIENT
Start: 2022-08-25

## 2022-09-09 ENCOUNTER — HOSPITAL ENCOUNTER (EMERGENCY)
Age: 47
Discharge: HOME | End: 2022-09-09
Attending: EMERGENCY MEDICINE
Payer: COMMERCIAL

## 2022-09-09 VITALS
SYSTOLIC BLOOD PRESSURE: 150 MMHG | WEIGHT: 283 LBS | DIASTOLIC BLOOD PRESSURE: 62 MMHG | HEIGHT: 65 IN | BODY MASS INDEX: 47.15 KG/M2 | RESPIRATION RATE: 18 BRPM | HEART RATE: 105 BPM | OXYGEN SATURATION: 95 % | TEMPERATURE: 98.6 F

## 2022-09-09 DIAGNOSIS — J02.9 ACUTE PHARYNGITIS, UNSPECIFIED ETIOLOGY: Primary | ICD-10-CM

## 2022-09-09 LAB
SARS-COV-2 RDRP RESP QL NAA+PROBE: NOT DETECTED
SOURCE: NORMAL
STREP, MOLECULAR: NOT DETECTED

## 2022-09-09 PROCEDURE — 99283 EMERGENCY DEPT VISIT LOW MDM: CPT

## 2022-09-09 PROCEDURE — 87651 STREP A DNA AMP PROBE: CPT

## 2022-09-09 PROCEDURE — 87635 SARS-COV-2 COVID-19 AMP PRB: CPT

## 2022-09-09 RX ORDER — CLINDAMYCIN HYDROCHLORIDE 300 MG/1
300 CAPSULE ORAL 3 TIMES DAILY
Qty: 30 CAPSULE | Refills: 0 | Status: SHIPPED | OUTPATIENT
Start: 2022-09-09 | End: 2022-09-19

## 2022-09-09 RX ORDER — BUPRENORPHINE AND NALOXONE 8; 2 MG/1; MG/1
1 FILM, SOLUBLE BUCCAL; SUBLINGUAL DAILY
COMMUNITY

## 2022-09-09 ASSESSMENT — ENCOUNTER SYMPTOMS
TROUBLE SWALLOWING: 0
SORE THROAT: 1
SHORTNESS OF BREATH: 0
RHINORRHEA: 0
VOICE CHANGE: 0
ABDOMINAL PAIN: 0
COUGH: 0
SINUS CONGESTION: 0

## 2022-09-09 ASSESSMENT — PAIN - FUNCTIONAL ASSESSMENT: PAIN_FUNCTIONAL_ASSESSMENT: 0-10

## 2022-09-09 ASSESSMENT — PAIN SCALES - GENERAL: PAINLEVEL_OUTOF10: 5

## 2022-09-09 ASSESSMENT — PAIN DESCRIPTION - LOCATION: LOCATION: THROAT

## 2022-09-09 NOTE — ED TRIAGE NOTES
\"When I woke up this morning my uvula swelling down to my tongue. \" Pt doesn't have swelling now but has redness.  Pt is able to drink tea and hold over 8oz down with no problem

## 2022-09-09 NOTE — ED NOTES
I have reviewed discharge instructions with the patient. The patient verbalized understanding. Patient left ED via Discharge Method: ambulatory to Home with (insert name of family/friend, self, transport self). Opportunity for questions and clarification provided. Patient given 1 scripts. To continue your aftercare when you leave the hospital, you may receive an automated call from our care team to check in on how you are doing. This is a free service and part of our promise to provide the best care and service to meet your aftercare needs.  If you have questions, or wish to unsubscribe from this service please call [phone]. Thank you for Choosing our [facility] Emergency Department.         Herber Ny RN  09/09/22 0123

## 2022-09-09 NOTE — ED PROVIDER NOTES
Emergency Department Provider Note                   PCP:                Tae Lynch MD               Age: 52 y.o. Sex: female       ICD-10-CM    1. Acute pharyngitis, unspecified etiology  J02.9           DISPOSITION Decision To Discharge 09/09/2022 01:17:54 PM        MDM  Number of Diagnoses or Management Options  Acute pharyngitis, unspecified etiology: new, needed workup  Diagnosis management comments: 51-year-old female who presents emergency department today with complaint of swelling and soreness to her tonsils and uvula. Noted enlarged tonsils with exudate on the tonsils and the uvula. There does not appear to be any swelling of the uvula. No angioedema. Patient is speaking in full sentences and tolerating oral secretions without difficulty. No stridor noted. COVID and strep are negative. Patient would prefer to be covered with antibiotics as she states that she is prone to infections. Will cover with clindamycin. Encouraged follow-up with her primary care provider. I have discussed the results of all labs, procedures, radiographs, and/or treatments with the patient and available family members. Treatment plan is agreed upon by the patient and the patient is ready for discharge. Questions about treatment in the ED and differential diagnosis of presenting condition were answered. Patient was given verbal discharge instructions including, but not limited to, importance of returning to the emergency department for any concern of worsening or continued symptoms. Instructions were given to follow-up with a primary care provider or specialist within 1 to 2 days. Adverse effects of medications, if prescribed, were discussed and the patient was advised to refrain from significant physical activity until followed up by a primary care physician and to not drive or operate heavy machinery after taking any sedating substances.          Amount and/or Complexity of Data Reviewed  Clinical lab tests: reviewed  Review and summarize past medical records: yes    Risk of Complications, Morbidity, and/or Mortality  Presenting problems: low  Diagnostic procedures: low  Management options: low    Patient Progress  Patient progress: improved       Orders Placed This Encounter   Procedures    Group A Strep Screen By PCR    COVID-19, Rapid        Medications - No data to display    Discharge Medication List as of 9/9/2022  1:26 PM        START taking these medications    Details   clindamycin (CLEOCIN) 300 MG capsule Take 1 capsule by mouth 3 times daily for 10 days, Disp-30 capsule, R-0Normal              Kaylynn Key is a 52 y.o. female who presents to the Emergency Department with chief complaint of    Chief Complaint   Patient presents with    Pharyngitis      66-year-old female who presents emergency department today with complaint of swelling and soreness to her tonsils and uvula. She states symptoms began today. She is unsure if she has had a fever but did take some ibuprofen earlier. She denies any chest pain, abdominal pain, difficulty swallowing, shortness of breath, or other associated symptoms today. The history is provided by the patient. Pharyngitis  Location:  Generalized  Quality:  Burning and sore  Severity:  Moderate  Onset quality:  Gradual  Duration:  1 day  Timing:  Constant  Progression:  Worsening  Chronicity:  New  Relieved by:  None tried  Worsened by:  Swallowing  Ineffective treatments:  None tried  Associated symptoms: no abdominal pain, no chills, no cough, no ear discharge, no ear pain, no fever, no headaches, no postnasal drip, no rhinorrhea, no shortness of breath, no sinus congestion, no trouble swallowing and no voice change        Review of Systems   Constitutional:  Negative for chills and fever. HENT:  Positive for sore throat. Negative for ear discharge, ear pain, postnasal drip, rhinorrhea, trouble swallowing and voice change.     Respiratory:  Negative for cough and shortness of breath. Gastrointestinal:  Negative for abdominal pain. Neurological:  Negative for headaches. All other systems reviewed and are negative.     Past Medical History:   Diagnosis Date    Asthma     Chronic right-sided low back pain with right-sided sciatica 3/20/2020    DDD (degenerative disc disease), lumbar 8/25/2020    Hepatitis C virus infection without hepatic coma     Hepatitis C virus infection without hepatic coma     Hepatitis C virus infection without hepatic coma     History of hepatitis C virus infection     History of hepatitis C virus infection     History of hepatitis C virus infection     Other ill-defined conditions(799.89)     fibra myalgia    Primary hypertension 4/18/2022    Psychiatric disorder     depression/anxiety    Uncontrolled type 2 diabetes mellitus with hyperglycemia ([facility] [address] 6/26/2020        Past Surgical History:   Procedure Laterality Date    GYN      laparoscopy times 4    HEENT      jaw        Family History   Problem Relation Age of Onset    Breast Cancer Maternal Grandmother         Social History     Socioeconomic History    Marital status: Single   Tobacco Use    Smoking status: Former    Smokeless tobacco: Never   Substance and Sexual Activity    Alcohol use: Yes    Drug use: Yes     Types: Opiates      Social Determinants of Health     Financial Resource Strain: Low Risk     Difficulty of Paying Living Expenses: Not hard at all   Food Insecurity: No Food Insecurity    Worried About Running Out of Food in the Last Year: Never true    Ran Out of Food in the Last Year: Never true   Transportation Needs: No Transportation Needs    Lack of Transportation (Medical): No    Lack of Transportation (Non-Medical): No         Hydroxyzine pamoate, Ketorolac tromethamine, Haloperidol lactate, Penicillins, and Rofecoxib     Discharge Medication List as of 9/9/2022  1:26 PM        CONTINUE these medications which have NOT CHANGED    Details   buprenorphine-naloxone (SUBOXONE) 8-2 MG FILM SL film Place 1 Film under the tongue daily. Historical Med      metFORMIN (GLUCOPHAGE-XR) 500 MG extended release tablet Take 1 tablet by mouth daily, Disp-90 tablet, R-0Normal      lisinopril (PRINIVIL;ZESTRIL) 2.5 MG tablet Take 1 tablet by mouth daily, Disp-90 tablet, R-0Normal      atorvastatin (LIPITOR) 10 MG tablet Take 1 tablet by mouth daily, Disp-90 tablet, R-0Normal      albuterol sulfate  (90 Base) MCG/ACT inhaler Inhale 2 puffs into the lungs every 4 hours as neededHistorical Med      ARIPiprazole (ABILIFY) 20 MG tablet Take 20 mg by mouth dailyHistorical Med      ergocalciferol (ERGOCALCIFEROL) 1.25 MG (24164 UT) capsule Take 50,000 Units by mouth every 7 daysHistorical Med      FLUoxetine (PROZAC) 20 MG capsule Take 60 mg by mouth dailyHistorical Med      fluticasone-vilanterol (BREO ELLIPTA) 100-25 MCG/INH AEPB inhaler Inhale 1 puff into the lungs dailyHistorical Med              Vitals signs and nursing note reviewed. Patient Vitals for the past 4 hrs:   Temp Pulse Resp BP SpO2   09/09/22 1236 98.6 °F (37 °C) (!) 105 18 (!) 150/62 95 %          Physical Exam  Vitals and nursing note reviewed. Constitutional:       General: She is not in acute distress. Appearance: Normal appearance. She is well-developed. She is obese. She is not ill-appearing, toxic-appearing or diaphoretic. HENT:      Head: Normocephalic and atraumatic. Right Ear: Tympanic membrane and ear canal normal.      Left Ear: Tympanic membrane and ear canal normal.      Mouth/Throat:      Mouth: Mucous membranes are moist.      Pharynx: Uvula midline. Oropharyngeal exudate present. No pharyngeal swelling, posterior oropharyngeal erythema or uvula swelling. Tonsils: Tonsillar exudate present. No tonsillar abscesses. 2+ on the right. 2+ on the left. Eyes:      General: No scleral icterus. Extraocular Movements: Extraocular movements intact. Cardiovascular:      Rate and Rhythm: Normal rate. Pulmonary:      Effort: Pulmonary effort is normal. No respiratory distress. Breath sounds: Normal breath sounds. No rhonchi. Abdominal:      General: Abdomen is flat. There is no distension. Skin:     General: Skin is warm and dry. Neurological:      General: No focal deficit present. Mental Status: She is alert and oriented to person, place, and time. Psychiatric:         Mood and Affect: Mood normal.         Behavior: Behavior normal.        Procedures      [unfilled]     No orders to display                          Voice dictation software was used during the making of this note. This software is not perfect and grammatical and other typographical errors may be present. This note has not been completely proofread for errors.        Robinson Angelo, APRN - [id number]   09/09/22 [id number]

## 2022-10-21 DIAGNOSIS — I10 ESSENTIAL (PRIMARY) HYPERTENSION: ICD-10-CM

## 2022-10-21 DIAGNOSIS — E11.8 TYPE 2 DIABETES MELLITUS WITH UNSPECIFIED COMPLICATIONS (HCC): ICD-10-CM

## 2022-10-21 DIAGNOSIS — E78.5 HYPERLIPIDEMIA, UNSPECIFIED HYPERLIPIDEMIA TYPE: ICD-10-CM

## 2022-10-21 DIAGNOSIS — Z53.20 COLONOSCOPY REFUSED: ICD-10-CM

## 2022-10-21 DIAGNOSIS — Z71.3 DIETARY COUNSELING AND SURVEILLANCE: ICD-10-CM

## 2022-10-21 DIAGNOSIS — E66.01 MORBID (SEVERE) OBESITY DUE TO EXCESS CALORIES (HCC): ICD-10-CM

## 2022-10-21 DIAGNOSIS — J45.30 MILD PERSISTENT ASTHMA, UNCOMPLICATED: ICD-10-CM

## 2022-10-22 LAB
ALBUMIN SERPL-MCNC: 3.3 G/DL (ref 3.5–5)
ALBUMIN/GLOB SERPL: 0.8 {RATIO} (ref 0.4–1.6)
ALP SERPL-CCNC: 110 U/L (ref 50–136)
ALT SERPL-CCNC: 48 U/L (ref 12–65)
ANION GAP SERPL CALC-SCNC: 7 MMOL/L (ref 2–11)
AST SERPL-CCNC: 48 U/L (ref 15–37)
BILIRUB SERPL-MCNC: 0.3 MG/DL (ref 0.2–1.1)
BUN SERPL-MCNC: 7 MG/DL (ref 6–23)
CALCIUM SERPL-MCNC: 9.6 MG/DL (ref 8.3–10.4)
CHLORIDE SERPL-SCNC: 104 MMOL/L (ref 101–110)
CHOLEST SERPL-MCNC: 178 MG/DL
CO2 SERPL-SCNC: 27 MMOL/L (ref 21–32)
CREAT SERPL-MCNC: 0.8 MG/DL (ref 0.6–1)
GLOBULIN SER CALC-MCNC: 4.1 G/DL (ref 2.8–4.5)
GLUCOSE SERPL-MCNC: 185 MG/DL (ref 65–100)
HDLC SERPL-MCNC: 47 MG/DL (ref 40–60)
HDLC SERPL: 3.8 {RATIO}
LDLC SERPL CALC-MCNC: 104.6 MG/DL
POTASSIUM SERPL-SCNC: 3.9 MMOL/L (ref 3.5–5.1)
PROT SERPL-MCNC: 7.4 G/DL (ref 6.3–8.2)
SODIUM SERPL-SCNC: 138 MMOL/L (ref 133–143)
TRIGL SERPL-MCNC: 132 MG/DL (ref 35–150)
VLDLC SERPL CALC-MCNC: 26.4 MG/DL (ref 6–23)

## 2022-10-23 LAB
EST. AVERAGE GLUCOSE BLD GHB EST-MCNC: 272 MG/DL
HBA1C MFR BLD: 11.1 % (ref 4.8–5.6)

## 2022-10-28 ENCOUNTER — TELEPHONE (OUTPATIENT)
Dept: PRIMARY CARE CLINIC | Facility: CLINIC | Age: 47
End: 2022-10-28

## 2022-10-28 NOTE — TELEPHONE ENCOUNTER
----- Message from Aislinn Killian MD sent at 10/26/2022  7:46 PM EDT -----  Pt has severely uncontrolled diabetes--need to make appt with me asap to discuss additional treatment/meds

## 2022-11-03 ENCOUNTER — TELEMEDICINE (OUTPATIENT)
Dept: PRIMARY CARE CLINIC | Facility: CLINIC | Age: 47
End: 2022-11-03
Payer: COMMERCIAL

## 2022-11-03 DIAGNOSIS — R74.01 ELEVATED AST (SGOT): ICD-10-CM

## 2022-11-03 DIAGNOSIS — E66.01 MORBID (SEVERE) OBESITY DUE TO EXCESS CALORIES (HCC): ICD-10-CM

## 2022-11-03 DIAGNOSIS — E13.649 UNCONTROLLED DIABETES MELLITUS OF OTHER TYPE WITH HYPOGLYCEMIA, UNSPECIFIED HYPOGLYCEMIA COMA STATUS (HCC): Primary | ICD-10-CM

## 2022-11-03 DIAGNOSIS — Z71.3 DIETARY COUNSELING AND SURVEILLANCE: ICD-10-CM

## 2022-11-03 PROBLEM — E11.649 UNCONTROLLED DIABETES MELLITUS WITH HYPOGLYCEMIA (HCC): Status: ACTIVE | Noted: 2022-11-03

## 2022-11-03 PROCEDURE — 99214 OFFICE O/P EST MOD 30 MIN: CPT | Performed by: FAMILY MEDICINE

## 2022-11-03 PROCEDURE — 3046F HEMOGLOBIN A1C LEVEL >9.0%: CPT | Performed by: FAMILY MEDICINE

## 2022-11-03 RX ORDER — METFORMIN HYDROCHLORIDE 500 MG/1
1000 TABLET, EXTENDED RELEASE ORAL DAILY
Qty: 180 TABLET | Refills: 0 | Status: SHIPPED | OUTPATIENT
Start: 2022-11-03

## 2022-11-03 NOTE — PROGRESS NOTES
[facility] - BUDDYY [address] [place] [address], [facility] Augusto Bob   Office : [phone]  Fax : [phone]      Pt was seen by synchronous (real-time) audio-video technology   I was at my home office while conducting this encounter  Pt was at home during the visit  Pts  healthcare decision maker is aware that this patient-initiated Telehealth encounter is a billable service, with coverage as determined by her insurance carrier. She is aware that she may receive a bill and has provided verbal consent to proceed:       Subjective: The patient is a 55 y.o. female  who presents for f/u on  UNCONTROLLED DIABETES  BS ABOVE 200 AVERAGE  pts diet/exercise poor  PT HAS GOOD COMPLIANCE WITH MED    multiple chronic medical conditions-good compliance with medications-pt here to get routeine labs and need refill on meds. no c  Psychiatric d/o-sees specialist-- anxiety  -on and off-pt to f/u with special;ist sooN  Hypertension-stable on med  Hyperlipidemia--pts on low carb diet and low fat diet -stable on med  Gerd -stable on diet /med  Chronic lowback pain-stable now--on and off exacerbation-pt was recently seen in ER for hthe same and right thigh pain-x-ray were normal  Pt sees pain clinic for pain meds-on suboxone-  Eye exam 9/2021 as per pt  Pt refused colonoscopy/pap/sleep study in 1/2022      Patient Active Problem List   Diagnosis Code    Schizoaffective disorder, depressive type ([address] F25.1    Psychiatric disorder F99    Fibromyalgia M79.7    Chronic pain syndrome G89.4    Screening for metabolic disorder F74.380    Screening for STD (sexually transmitted disease) Z11.3    Dietary counseling Z71.3    Obesity, unspecified obesity severity, unspecified obesity type E66.9    Anxiety F41.9    Obesity, morbid ([address] E66.01    Physical exam Z00.00    Hepatitis C virus infection without hepatic coma B19.20    Hyperlipidemia E78.5    Hypertriglyceridemia E78.1    Elevated BP without diagnosis of hypertension R03.0 History of hepatitis C virus infection Z86.19    Right thigh pain M79.651    Chronic right-sided low back pain with right-sided sciatica M54.41, G89.29    Low serum potassium E87.6    Prediabetes R73.03    Exercise counseling Z71.82    Elevated blood sugar R73.9    Uncontrolled type 2 diabetes mellitus with hyperglycemia (HCC) E11.65    Acute bilateral low back pain with right-sided sciatica M54.41    DDD (degenerative disc disease), lumbar M51.36    Dysmenorrhea N94.6    Exacerbation of persistent asthma J45.901    Mild intermittent asthma J45.20    Bronchitis J40    Sinusitis J32.9    Moderate asthma with exacerbation J45.901    Iron deficiency anemia secondary to inadequate dietary iron intake D50.8    Controlled type 2 diabetes mellitus with complication, without long-term current use of insulin (HCC) E11.8    Anemia D64.9    Right sided sciatica M54.31    Colonoscopy refused Z53.20    Controlled mild persistent asthma J45.30    Menorrhagia with regular cycle N92.0    Snoring R06.83    Fatigue R53.83    Myalgia M79.10    Chest pain R07.9    Primary hypertension I10       Past Medical History:   Diagnosis Date    Asthma     Chronic right-sided low back pain with right-sided sciatica 3/20/2020    DDD (degenerative disc disease), lumbar 8/25/2020    Hepatitis C virus infection without hepatic coma     Hepatitis C virus infection without hepatic coma     Hepatitis C virus infection without hepatic coma     History of hepatitis C virus infection     History of hepatitis C virus infection     History of hepatitis C virus infection     Other ill-defined conditions(799.89)     fibra myalgia    Primary hypertension 4/18/2022    Psychiatric disorder     depression/anxiety    Uncontrolled type 2 diabetes mellitus with hyperglycemia ([facility] 6/26/2020       Past Surgical History:   Procedure Laterality Date    HX GYN      laparoscopy times 4    HX HEENT      jaw       Social History     Socioeconomic History    Marital status: SINGLE     Spouse name: Not on file    Number of children: Not on file    Years of education: Not on file    Highest education level: Not on file   Occupational History    Not on file   Tobacco Use    Smoking status: Never Smoker    Smokeless tobacco: Never Used   Substance and Sexual Activity    Alcohol use: Yes     Comment: occ    Drug use: Yes     Types: Opiates    Sexual activity: Not on file   Other Topics Concern    Not on file   Social History Narrative    Not on file     Social Determinants of Health     Financial Resource Strain: Low Risk     Difficulty of Paying Living Expenses: Not hard at all   Food Insecurity: No Food Insecurity    Worried About [address] in the Last Year: Never true    [address] [facility] [address] in the Last Year: Never true   Transportation Needs:     Lack of Transportation (Medical): Not on file    Lack of Transportation (Non-Medical):  Not on file   Physical Activity:     Days of Exercise per Week: Not on file    Minutes of Exercise per Session: Not on file   Stress:     Feeling of Stress : Not on file   Social Connections:     Frequency of Communication with Friends and Family: Not on file    Frequency of Social Gatherings with Friends and Family: Not on file    Attends Orthodoxy Services: Not on file    Active Member of Clubs or Organizations: Not on file    Attends Club or Organization Meetings: Not on file    Marital Status: Not on file   Intimate Partner Violence:     Fear of Current or Ex-Partner: Not on file    Emotionally Abused: Not on file    Physically Abused: Not on file    Sexually Abused: Not on file   Housing Stability:     Unable to Pay for Housing in the Last Year: Not on file    Number of [place] in the Last Year: Not on file    Unstable Housing in the Last Year: Not on file       Allergies   Allergen Reactions    Haldol [Haloperidol Lactate] Rash    Pcn [Penicillins] Nausea and Vomiting    Toradol [Ketorolac Tromethamine] Other (comments)     \"it made me feel really worse\"    Vioxx [Rofecoxib] Palpitations    Vistaril [Hydroxyzine Pamoate] Other (comments)     \"just makes me feel worse\"       Current Outpatient Medications   Medication Sig Dispense Refill    atorvastatin (LIPITOR) 10 mg tablet Take 1 Tablet by mouth daily. PM 90 Tablet 0    lisinopriL (PRINIVIL, ZESTRIL) 2.5 mg tablet Take 1 Tablet by mouth daily. 90 Tablet 0    metFORMIN ER (GLUCOPHAGE XR) 500 mg tablet Take 1 Tablet by mouth daily. 90 Tablet 0    methocarbamoL (ROBAXIN) 500 mg tablet Take 1 Tablet by mouth two (2) times daily as needed for Muscle Spasm(s). 20 Tablet 0    ergocalciferol (ERGOCALCIFEROL) 1,250 mcg (50,000 unit) capsule Take 1 Capsule by mouth every seven (7) days. 4 Capsule 3    fluticasone furoate-vilanteroL (BREO ELLIPTA) 100-25 mcg/dose inhaler Take 1 Puff by inhalation daily. 1 Each 5    albuterol (PROVENTIL HFA, VENTOLIN HFA, PROAIR HFA) 90 mcg/actuation inhaler Take 2 Puffs by inhalation every four (4) hours as needed for Wheezing. 1 Each 5    FLUoxetine (PROZAC) 20 mg capsule Take 60 mg by mouth daily. ARIPiprazole (ABILIFY) 20 mg tablet Take 20 mg by mouth daily. Review of Systems   Constitutional: fatigue   HENT: Negative. Eyes: Negative. Respiratory: Negative. Cardiovascular: Negative. Gastrointestinal: Negative. Genitourinary: Negative. Musculoskeletal: Positive for myalgias. Skin: Negative. Neurological: Negative. Endo/Heme/Allergies: Negative. Psychiatric/Behavioral: Positive for depression. The patient is nervous/anxious. Objective:    Visit Vitals  BP (!) 132/90 (BP 1 Location: Left upper arm, BP Patient Position: Sitting, BP Cuff Size: Adult)   Pulse (!) 108   Temp 97.7 °F (36.5 °C) (Temporal)   Ht 5' 6\" (1.676 m)   Wt 298 lb 3.2 oz (135.3 kg)   LMP 04/15/2022   SpO2 96%   BMI 48.13 kg/m²       Physical Exam   Constitutional: She is oriented to person, place, and time. She appears well-developed.    Obese HENT:   Head: Normocephalic and atraumatic. Right Ear: External ear normal.   Left Ear: External ear normal.   Nose: Nose normal.   Mouth/Throat: Oropharynx is clear and moist.     Pulmonary/Chest: Effort normal      Musculoskeletal: Normal range of motion. Neurological: She is alert and oriented to person, place, and time. Skin: Skin is warm and dry. Psychiatric: She has a normal mood and affect. Her behavior is normal. Judgment and thought content normal.         . RESULTRCNTNC(15W    ASSESSMENT/PLAN:    1. Uncontrolled diabetes mellitus of other type with hypoglycemia, unspecified hypoglycemia coma status ([facility]  Comments:  increase metformin to 100 mg daily  +  pt to follow better low crb diet  exercise daily  f/u in 2 months for recheck  Overview:  increase metformin to 100 mg daily  +  pt to follow better low crb diet  exercise daily  f/u in 2 months for recheck    Orders:  -     metFORMIN (GLUCOPHAGE-XR) 500 MG extended release tablet; Take 2 tablets by mouth daily Dose increased to 1000 mg on 11/3/22, Disp-180 tablet, R-0Normal  -     Comprehensive Metabolic Panel; Future  -     Hemoglobin A1C; Future  2. Elevated AST (SGOT)  Comments:  likely fatty liver vs med use  avoid tylenol  control fat/carb intake    Overview:  likely fatty liver vs med use  avoid tylenol  control fat/carb intake      Orders:  -     Comprehensive Metabolic Panel; Future  -     Hemoglobin A1C; Future  3. Morbid (severe) obesity due to excess calories ([facility]  Overview:  Weight loss encouraged      Orders:  -     Comprehensive Metabolic Panel; Future  -     Hemoglobin A1C; Future  4. Dietary counseling and surveillance  Overview:  low calorie diet discussed      Orders:  -     Comprehensive Metabolic Panel;  Future  -     Hemoglobin A1C; Future     Orders Placed This Encounter   Procedures    Comprehensive Metabolic Panel     Standing Status:   Future     Standing Expiration Date:   11/3/2023    Hemoglobin A1C     Standing Status: Future     Standing Expiration Date:   11/3/2023        Orders Placed This Encounter   Medications    metFORMIN (GLUCOPHAGE-XR) 500 MG extended release tablet     Sig: Take 2 tablets by mouth daily Dose increased to 1000 mg on 11/3/22     Dispense:  180 tablet     Refill:  0        Results for orders placed or performed in visit on 01/06/22   CBC W/O DIFF   Result Value Ref Range    WBC 8.6 3.4 - 10.8 x10E3/uL    RBC 4.79 3.77 - 5.28 x10E6/uL    HGB 11.8 11.1 - 15.9 g/dL    HCT 38.8 34.0 - 46.6 %    MCV 81 79 - 97 fL    MCH 24.6 (L) 26.6 - 33.0 pg    MCHC 30.4 (L) 31.5 - 35.7 g/dL    RDW 13.4 11.7 - 15.4 %    PLATELET 777 475 - 097 Z78Q4/PW   METABOLIC PANEL, COMPREHENSIVE   Result Value Ref Range    Glucose 144 (H) 65 - 99 mg/dL    BUN 9 6 - 24 mg/dL    Creatinine 0.81 0.57 - 1.00 mg/dL    GFR est non-AA 87 >59 mL/min/1.73    GFR est  >59 mL/min/1.73    BUN/Creatinine ratio 11 9 - 23    Sodium 142 134 - 144 mmol/L    Potassium 4.2 3.5 - 5.2 mmol/L    Chloride 98 96 - 106 mmol/L    CO2 29 20 - 29 mmol/L    Calcium 9.4 8.7 - 10.2 mg/dL    Protein, total 6.9 6.0 - 8.5 g/dL    Albumin 3.9 3.8 - 4.8 g/dL    GLOBULIN, TOTAL 3.0 1.5 - 4.5 g/dL    A-G Ratio 1.3 1.2 - 2.2    Bilirubin, total 0.3 0.0 - 1.2 mg/dL    Alk.  phosphatase 118 44 - 121 IU/L    AST (SGOT) 20 0 - 40 IU/L    ALT (SGPT) 22 0 - 32 IU/L   HEMOGLOBIN A1C W/O EAG   Result Value Ref Range    Hemoglobin A1c 6.8 (H) 4.8 - 5.6 %   LIPID PANEL   Result Value Ref Range    Cholesterol, total 205 (H) 100 - 199 mg/dL    Triglyceride 200 (H) 0 - 149 mg/dL    HDL Cholesterol 53 >39 mg/dL    VLDL, calculated 35 5 - 40 mg/dL    LDL, calculated 117 (H) 0 - 99 mg/dL   T4, FREE   Result Value Ref Range    T4, Free 1.04 0.82 - 1.77 ng/dL   TSH 3RD GENERATION   Result Value Ref Range    TSH 3.650 0.450 - 4.500 uIU/mL   ANTINUCLEAR ANTIBODIES, IFA   Result Value Ref Range    Antinuclear Abs, IFA Negative    RHEUMATOID FACTOR, QL   Result Value Ref Range    Rheumatoid factor <10.0 <14.0 IU/mL   IRON   Result Value Ref Range    Iron 45 27 - 159 ug/dL   VITAMIN D, 25 HYDROXY   Result Value Ref Range    VITAMIN D, 25-HYDROXY 12.2 (L) 30.0 - 100.0 ng/mL     We discussed the expected course, resolution and complications of the diagnosis(es) in detail. Medication risks, benefits, costs, interactions, and alternatives were discussed as indicated. I advised her to contact the office if her condition worsens, changes or fails to improve as anticipated. She expressed understanding with the diagnosis(es) and plan. Follow-up and Dispositions    Return in about 2 months         Yvonne Love MD

## 2023-01-24 ENCOUNTER — OFFICE VISIT (OUTPATIENT)
Dept: PRIMARY CARE CLINIC | Facility: CLINIC | Age: 48
End: 2023-01-24
Payer: COMMERCIAL

## 2023-01-24 VITALS
HEIGHT: 65 IN | OXYGEN SATURATION: 96 % | WEIGHT: 279 LBS | TEMPERATURE: 97 F | BODY MASS INDEX: 46.48 KG/M2 | DIASTOLIC BLOOD PRESSURE: 60 MMHG | SYSTOLIC BLOOD PRESSURE: 108 MMHG | HEART RATE: 81 BPM

## 2023-01-24 DIAGNOSIS — J45.30 MILD PERSISTENT ASTHMA, UNCOMPLICATED: ICD-10-CM

## 2023-01-24 DIAGNOSIS — E13.649 UNCONTROLLED DIABETES MELLITUS OF OTHER TYPE WITH HYPOGLYCEMIA, UNSPECIFIED HYPOGLYCEMIA COMA STATUS (HCC): Primary | ICD-10-CM

## 2023-01-24 DIAGNOSIS — Z91.14 HX OF MEDICATION NONCOMPLIANCE: ICD-10-CM

## 2023-01-24 DIAGNOSIS — Z53.20 COLONOSCOPY REFUSED: ICD-10-CM

## 2023-01-24 DIAGNOSIS — Z12.31 SCREENING MAMMOGRAM FOR BREAST CANCER: ICD-10-CM

## 2023-01-24 DIAGNOSIS — G89.4 CHRONIC PAIN SYNDROME: ICD-10-CM

## 2023-01-24 DIAGNOSIS — R74.01 ELEVATED AST (SGOT): ICD-10-CM

## 2023-01-24 DIAGNOSIS — E78.5 HYPERLIPIDEMIA, UNSPECIFIED HYPERLIPIDEMIA TYPE: ICD-10-CM

## 2023-01-24 DIAGNOSIS — I10 PRIMARY HYPERTENSION: ICD-10-CM

## 2023-01-24 PROBLEM — Z91.148 HX OF MEDICATION NONCOMPLIANCE: Status: ACTIVE | Noted: 2023-01-24

## 2023-01-24 PROCEDURE — 99214 OFFICE O/P EST MOD 30 MIN: CPT | Performed by: FAMILY MEDICINE

## 2023-01-24 PROCEDURE — 3074F SYST BP LT 130 MM HG: CPT | Performed by: FAMILY MEDICINE

## 2023-01-24 PROCEDURE — 3078F DIAST BP <80 MM HG: CPT | Performed by: FAMILY MEDICINE

## 2023-01-24 RX ORDER — ALBUTEROL SULFATE 90 UG/1
2 AEROSOL, METERED RESPIRATORY (INHALATION) EVERY 4 HOURS PRN
Qty: 18 G | Refills: 2 | Status: SHIPPED | OUTPATIENT
Start: 2023-01-24

## 2023-01-24 RX ORDER — LISINOPRIL 2.5 MG/1
2.5 TABLET ORAL DAILY
Qty: 90 TABLET | Refills: 0 | Status: SHIPPED | OUTPATIENT
Start: 2023-01-24

## 2023-01-24 RX ORDER — METFORMIN HYDROCHLORIDE 500 MG/1
1000 TABLET, EXTENDED RELEASE ORAL DAILY
Qty: 180 TABLET | Refills: 0 | Status: SHIPPED | OUTPATIENT
Start: 2023-01-24

## 2023-01-24 RX ORDER — ATORVASTATIN CALCIUM 10 MG/1
10 TABLET, FILM COATED ORAL DAILY
Qty: 90 TABLET | Refills: 0 | Status: SHIPPED | OUTPATIENT
Start: 2023-01-24

## 2023-01-24 SDOH — ECONOMIC STABILITY: HOUSING INSECURITY
IN THE LAST 12 MONTHS, WAS THERE A TIME WHEN YOU DID NOT HAVE A STEADY PLACE TO SLEEP OR SLEPT IN A SHELTER (INCLUDING NOW)?: NO

## 2023-01-24 SDOH — ECONOMIC STABILITY: HOUSING INSECURITY: IN THE LAST 12 MONTHS, HOW MANY PLACES HAVE YOU LIVED?: 1

## 2023-01-24 SDOH — HEALTH STABILITY: PHYSICAL HEALTH: ON AVERAGE, HOW MANY DAYS PER WEEK DO YOU ENGAGE IN MODERATE TO STRENUOUS EXERCISE (LIKE A BRISK WALK)?: 3 DAYS

## 2023-01-24 SDOH — HEALTH STABILITY: PHYSICAL HEALTH: ON AVERAGE, HOW MANY MINUTES DO YOU ENGAGE IN EXERCISE AT THIS LEVEL?: 30 MIN

## 2023-01-24 SDOH — ECONOMIC STABILITY: INCOME INSECURITY: IN THE LAST 12 MONTHS, WAS THERE A TIME WHEN YOU WERE NOT ABLE TO PAY THE MORTGAGE OR RENT ON TIME?: NO

## 2023-01-24 ASSESSMENT — PATIENT HEALTH QUESTIONNAIRE - PHQ9
2. FEELING DOWN, DEPRESSED OR HOPELESS: 1
4. FEELING TIRED OR HAVING LITTLE ENERGY: 1
3. TROUBLE FALLING OR STAYING ASLEEP: 1
9. THOUGHTS THAT YOU WOULD BE BETTER OFF DEAD, OR OF HURTING YOURSELF: 0
SUM OF ALL RESPONSES TO PHQ QUESTIONS 1-9: 6
SUM OF ALL RESPONSES TO PHQ QUESTIONS 1-9: 6
7. TROUBLE CONCENTRATING ON THINGS, SUCH AS READING THE NEWSPAPER OR WATCHING TELEVISION: 1
SUM OF ALL RESPONSES TO PHQ9 QUESTIONS 1 & 2: 2
SUM OF ALL RESPONSES TO PHQ QUESTIONS 1-9: 6
6. FEELING BAD ABOUT YOURSELF - OR THAT YOU ARE A FAILURE OR HAVE LET YOURSELF OR YOUR FAMILY DOWN: 0
8. MOVING OR SPEAKING SO SLOWLY THAT OTHER PEOPLE COULD HAVE NOTICED. OR THE OPPOSITE, BEING SO FIGETY OR RESTLESS THAT YOU HAVE BEEN MOVING AROUND A LOT MORE THAN USUAL: 1
10. IF YOU CHECKED OFF ANY PROBLEMS, HOW DIFFICULT HAVE THESE PROBLEMS MADE IT FOR YOU TO DO YOUR WORK, TAKE CARE OF THINGS AT HOME, OR GET ALONG WITH OTHER PEOPLE: 1
SUM OF ALL RESPONSES TO PHQ QUESTIONS 1-9: 6
1. LITTLE INTEREST OR PLEASURE IN DOING THINGS: 1
5. POOR APPETITE OR OVEREATING: 0

## 2023-01-24 ASSESSMENT — SOCIAL DETERMINANTS OF HEALTH (SDOH)
HOW OFTEN DO YOU ATTEND CHURCH OR RELIGIOUS SERVICES?: NEVER
WITHIN THE LAST YEAR, HAVE YOU BEEN HUMILIATED OR EMOTIONALLY ABUSED IN OTHER WAYS BY YOUR PARTNER OR EX-PARTNER?: NO
HOW OFTEN DO YOU ATTENT MEETINGS OF THE CLUB OR ORGANIZATION YOU BELONG TO?: MORE THAN 4 TIMES PER YEAR
HOW OFTEN DO YOU GET TOGETHER WITH FRIENDS OR RELATIVES?: MORE THAN THREE TIMES A WEEK
WITHIN THE LAST YEAR, HAVE YOU BEEN KICKED, HIT, SLAPPED, OR OTHERWISE PHYSICALLY HURT BY YOUR PARTNER OR EX-PARTNER?: NO
DO YOU BELONG TO ANY CLUBS OR ORGANIZATIONS SUCH AS CHURCH GROUPS UNIONS, FRATERNAL OR ATHLETIC GROUPS, OR SCHOOL GROUPS?: YES
ARE YOU MARRIED, WIDOWED, DIVORCED, SEPARATED, NEVER MARRIED, OR LIVING WITH A PARTNER?: NEVER MARRIED
WITHIN THE LAST YEAR, HAVE TO BEEN RAPED OR FORCED TO HAVE ANY KIND OF SEXUAL ACTIVITY BY YOUR PARTNER OR EX-PARTNER?: NO
IN A TYPICAL WEEK, HOW MANY TIMES DO YOU TALK ON THE PHONE WITH FAMILY, FRIENDS, OR NEIGHBORS?: MORE THAN THREE TIMES A WEEK
WITHIN THE LAST YEAR, HAVE YOU BEEN AFRAID OF YOUR PARTNER OR EX-PARTNER?: NO

## 2023-01-24 NOTE — PROGRESS NOTES
Eye exam 9/2021 as per pt  Pt refused colonoscopy/pap/sleep study in 1/2022      [address] [address]  Office : [phone]  Fax : [phone]      Pt was seen by synchronous (real-time) audio-video technology   I was at my home office while conducting this encounter  Pt was at home during the visit  Pts  healthcare decision maker is aware that this patient-initiated Telehealth encounter is a billable service, with coverage as determined by her insurance carrier. She is aware that she may receive a bill and has provided verbal consent to proceed:       Subjective: The patient is a 55 y.o. female  who presents for f/u on multiple chronic medical conditions-good compliance with medications-pt here to get routeine labs and need refill on meds. no c  UNCONTROLLED diabetes -pts blood sugar  NOT stable on med sec to noncompliance-pt was out of med when A1C was high-now taking med daily--need recheck  pts diet/exercise poor  Hypertension-stable on med  Hyperlipidemia--pts on low carb diet and low fat diet -stable on med  Gerd -stable on diet /med  Chronic lowback pain-stable now--on and off exacerbation-pt was recently seen in ER for hthe same and right thigh pain-x-ray were normal  Pt sees pain clinic for pain meds-on suboxone-  Psychiatric d/o-sees specialist-- anxiety  -on and off-pt to f/u with special;ist soon      Patient Active Problem List   Diagnosis Code    Schizoaffective disorder, depressive type ([facility] [address] F25.1    Psychiatric disorder F99    Fibromyalgia M79.7    Chronic pain syndrome G89.4    Screening for metabolic disorder J61.958    Screening for STD (sexually transmitted disease) Z11.3    Dietary counseling Z71.3    Obesity, unspecified obesity severity, unspecified obesity type E66.9    Anxiety F41.9    Obesity, morbid ([address] E66.01    Physical exam Z00.00    Hepatitis C virus infection without hepatic coma B19.20    Hyperlipidemia E78.5 Hypertriglyceridemia E78.1    Elevated BP without diagnosis of hypertension R03.0    History of hepatitis C virus infection Z86.19    Right thigh pain M79.651    Chronic right-sided low back pain with right-sided sciatica M54.41, G89.29    Low serum potassium E87.6    Prediabetes R73.03    Exercise counseling Z71.82    Elevated blood sugar R73.9    Uncontrolled type 2 diabetes mellitus with hyperglycemia (HCC) E11.65    Acute bilateral low back pain with right-sided sciatica M54.41    DDD (degenerative disc disease), lumbar M51.36    Dysmenorrhea N94.6    Exacerbation of persistent asthma J45.901    Mild intermittent asthma J45.20    Bronchitis J40    Sinusitis J32.9    Moderate asthma with exacerbation J45.901    Iron deficiency anemia secondary to inadequate dietary iron intake D50.8    Controlled type 2 diabetes mellitus with complication, without long-term current use of insulin (HCC) E11.8    Anemia D64.9    Right sided sciatica M54.31    Colonoscopy refused Z53.20    Controlled mild persistent asthma J45.30    Menorrhagia with regular cycle N92.0    Snoring R06.83    Fatigue R53.83    Myalgia M79.10    Chest pain R07.9    Primary hypertension I10       Past Medical History:   Diagnosis Date    Asthma     Chronic right-sided low back pain with right-sided sciatica 3/20/2020    DDD (degenerative disc disease), lumbar 8/25/2020    Hepatitis C virus infection without hepatic coma     Hepatitis C virus infection without hepatic coma     Hepatitis C virus infection without hepatic coma     History of hepatitis C virus infection     History of hepatitis C virus infection     History of hepatitis C virus infection     Other ill-defined conditions(799.89)     fibra myalgia    Primary hypertension 4/18/2022    Psychiatric disorder     depression/anxiety    Uncontrolled type 2 diabetes mellitus with hyperglycemia ([facility] [address] 6/26/2020       Past Surgical History:   Procedure Laterality Date    HX GYN      laparoscopy times 4 HX HEENT      jaw       Social History     Socioeconomic History    Marital status: SINGLE     Spouse name: Not on file    Number of children: Not on file    Years of education: Not on file    Highest education level: Not on file   Occupational History    Not on file   Tobacco Use    Smoking status: Never Smoker    Smokeless tobacco: Never Used   Substance and Sexual Activity    Alcohol use: Yes     Comment: occ    Drug use: Yes     Types: Opiates    Sexual activity: Not on file   Other Topics Concern    Not on file   Social History Narrative    Not on file     Social Determinants of Health     Financial Resource Strain: Low Risk     Difficulty of Paying Living Expenses: Not hard at all   Food Insecurity: No Food Insecurity    Worried About [employer] [employer] in the Last Year: Never true    [address] [place] in the Last Year: Never true   Transportation Needs:     Lack of Transportation (Medical): Not on file    Lack of Transportation (Non-Medical):  Not on file   Physical Activity:     Days of Exercise per Week: Not on file    Minutes of Exercise per Session: Not on file   Stress:     Feeling of Stress : Not on file   Social Connections:     Frequency of Communication with Friends and Family: Not on file    Frequency of Social Gatherings with Friends and Family: Not on file    Attends Yazidism Services: Not on file    Active Member of Clubs or Organizations: Not on file    Attends Club or Organization Meetings: Not on file    Marital Status: Not on file   Intimate Partner Violence:     Fear of Current or Ex-Partner: Not on file    Emotionally Abused: Not on file    Physically Abused: Not on file    Sexually Abused: Not on file   Housing Stability:     Unable to Pay for Housing in the Last Year: Not on file    Number of [place] in the Last Year: Not on file    Unstable Housing in the Last Year: Not on file       Allergies   Allergen Reactions    Haldol [Haloperidol Lactate] Rash    Pcn [Penicillins] Nausea and Vomiting    Toradol [Ketorolac Tromethamine] Other (comments)     \"it made me feel really worse\"    Vioxx [Rofecoxib] Palpitations    Vistaril [Hydroxyzine Pamoate] Other (comments)     \"just makes me feel worse\"       Current Outpatient Medications   Medication Sig Dispense Refill    atorvastatin (LIPITOR) 10 mg tablet Take 1 Tablet by mouth daily. PM 90 Tablet 0    lisinopriL (PRINIVIL, ZESTRIL) 2.5 mg tablet Take 1 Tablet by mouth daily. 90 Tablet 0    metFORMIN ER (GLUCOPHAGE XR) 500 mg tablet Take 1 Tablet by mouth daily. 90 Tablet 0    methocarbamoL (ROBAXIN) 500 mg tablet Take 1 Tablet by mouth two (2) times daily as needed for Muscle Spasm(s). 20 Tablet 0    ergocalciferol (ERGOCALCIFEROL) 1,250 mcg (50,000 unit) capsule Take 1 Capsule by mouth every seven (7) days. 4 Capsule 3    fluticasone furoate-vilanteroL (BREO ELLIPTA) 100-25 mcg/dose inhaler Take 1 Puff by inhalation daily. 1 Each 5    albuterol (PROVENTIL HFA, VENTOLIN HFA, PROAIR HFA) 90 mcg/actuation inhaler Take 2 Puffs by inhalation every four (4) hours as needed for Wheezing. 1 Each 5    FLUoxetine (PROZAC) 20 mg capsule Take 60 mg by mouth daily. ARIPiprazole (ABILIFY) 20 mg tablet Take 20 mg by mouth daily. Review of Systems   Constitutional: fatigue   HENT: Negative. Eyes: Negative. Respiratory: Negative. Cardiovascular: Negative. Gastrointestinal: Negative. Genitourinary: Negative. Musculoskeletal: Positive for myalgias. Skin: Negative. Neurological: Negative. Endo/Heme/Allergies: Negative. Psychiatric/Behavioral: Positive for depression. The patient is nervous/anxious.             Objective:    Visit Vitals  Vitals:    01/24/23 1356   BP: 108/60   Site: Left Upper Arm   Position: Sitting   Cuff Size: Large Adult   Pulse: 81   Temp: 97 °F (36.1 °C)   TempSrc: Temporal   SpO2: 96%   Weight: 279 lb (126.6 kg)   Height: 5' 5\" (1.651 m)        Physical Exam   Constitutional: She is oriented to person, place, and time. She appears well-developed. Obese     HENT:   Head: Normocephalic and atraumatic. Right Ear: External ear normal.   Left Ear: External ear normal.   Nose: Nose normal.   Mouth/Throat: Oropharynx is clear and moist.   Eyes: Pupils are equal, round, and reactive to light. Conjunctivae and EOM are normal.   Neck: Normal range of motion. Neck supple. Cardiovascular: Normal rate, regular rhythm, normal heart sounds and intact distal pulses. Pulmonary/Chest: Effort normal and breath sounds normal.   Abdominal: Soft. Bowel sounds are normal.   Musculoskeletal: Normal range of motion. Neurological: She is alert and oriented to person, place, and time. She has normal reflexes. Skin: Skin is warm and dry. Psychiatric: She has a normal mood and affect. Her behavior is normal. Judgment and thought content normal.         . RESULTRCNTNC(15W    ASSESSMENT/PLAN:    1. Uncontrolled diabetes mellitus of other type with hypoglycemia, unspecified hypoglycemia coma status ([facility] [address]  Comments:  increase metformin to 100 mg daily  +  pt to follow better low crb diet  exercise daily  f/u in 2 months for recheck  Overview:  increase metformin to 100 mg daily  +  pt to follow better low crb diet  exercise daily  f/u in 2 months for recheck    Orders:  -     metFORMIN (GLUCOPHAGE-XR) 500 MG extended release tablet; Take 2 tablets by mouth daily Dose increased to 1000 mg on 11/3/22, Disp-180 tablet, R-0Normal  -     Comprehensive Metabolic Panel; Future  -     Hemoglobin A1C; Future  -     Microalbumin / Creatinine Urine Ratio; Future  -     Lipid Panel; Future  2. Hyperlipidemia, unspecified hyperlipidemia type  Overview:  Diet controlled  low fat diet      Orders:  -     atorvastatin (LIPITOR) 10 MG tablet; Take 1 tablet by mouth daily, Disp-90 tablet, R-0Normal  -     Comprehensive Metabolic Panel; Future  -     Hemoglobin A1C; Future  -     Microalbumin / Creatinine Urine Ratio;  Future  -     Lipid Panel; Future  3. Elevated AST (SGOT)  Overview:  likely fatty liver vs med use  avoid tylenol  control fat/carb intake      4. Hx of medication noncompliance  5. Colonoscopy refused  Overview:  7/2021      6. Mild persistent asthma, uncomplicated  -     albuterol sulfate HFA (PROVENTIL;VENTOLIN;PROAIR) 108 (90 Base) MCG/ACT inhaler; Inhale 2 puffs into the lungs every 4 hours as needed for Wheezing or Shortness of Breath, Disp-18 g, R-2Normal  7. Chronic pain syndrome  Overview:  Sees pain clinic  On narcotic-SUBOXONE  7/2021  Pt wants to see new pain clinic      8. Screening mammogram for breast cancer  -     JAJA SCREENING W CAD BILATERAL 2 VW; Future  9.  Primary hypertension  Overview:  Stable with med  Refilled  Labs   Annual eye exam recommended  F/u in 3 months     Orders Placed This Encounter   Procedures    JAJA SCREENING W CAD BILATERAL 2 VW     Standing Status:   Future     Standing Expiration Date:   3/24/2024    Comprehensive Metabolic Panel     Standing Status:   Future     Standing Expiration Date:   1/24/2024    Hemoglobin A1C     Standing Status:   Future     Standing Expiration Date:   1/24/2024    Microalbumin / Creatinine Urine Ratio     Standing Status:   Future     Standing Expiration Date:   1/24/2024    Lipid Panel     Standing Status:   Future     Standing Expiration Date:   1/24/2024     Order Specific Question:   Is Patient Fasting?/# of Hours     Answer:   0      Orders Placed This Encounter   Medications    atorvastatin (LIPITOR) 10 MG tablet     Sig: Take 1 tablet by mouth daily     Dispense:  90 tablet     Refill:  0    lisinopril (PRINIVIL;ZESTRIL) 2.5 MG tablet     Sig: Take 1 tablet by mouth daily     Dispense:  90 tablet     Refill:  0    metFORMIN (GLUCOPHAGE-XR) 500 MG extended release tablet     Sig: Take 2 tablets by mouth daily Dose increased to 1000 mg on 11/3/22     Dispense:  180 tablet     Refill:  0    albuterol sulfate HFA (PROVENTIL;VENTOLIN;PROAIR) 108 (90 Base) MCG/ACT inhaler Sig: Inhale 2 puffs into the lungs every 4 hours as needed for Wheezing or Shortness of Breath     Dispense:  18 g     Refill:  2        Results for orders placed or performed in visit on 01/06/22   CBC W/O DIFF   Result Value Ref Range    WBC 8.6 3.4 - 10.8 x10E3/uL    RBC 4.79 3.77 - 5.28 x10E6/uL    HGB 11.8 11.1 - 15.9 g/dL    HCT 38.8 34.0 - 46.6 %    MCV 81 79 - 97 fL    MCH 24.6 (L) 26.6 - 33.0 pg    MCHC 30.4 (L) 31.5 - 35.7 g/dL    RDW 13.4 11.7 - 15.4 %    PLATELET 536 965 - 499 T23G4/YX   METABOLIC PANEL, COMPREHENSIVE   Result Value Ref Range    Glucose 144 (H) 65 - 99 mg/dL    BUN 9 6 - 24 mg/dL    Creatinine 0.81 0.57 - 1.00 mg/dL    GFR est non-AA 87 >59 mL/min/1.73    GFR est  >59 mL/min/1.73    BUN/Creatinine ratio 11 9 - 23    Sodium 142 134 - 144 mmol/L    Potassium 4.2 3.5 - 5.2 mmol/L    Chloride 98 96 - 106 mmol/L    CO2 29 20 - 29 mmol/L    Calcium 9.4 8.7 - 10.2 mg/dL    Protein, total 6.9 6.0 - 8.5 g/dL    Albumin 3.9 3.8 - 4.8 g/dL    GLOBULIN, TOTAL 3.0 1.5 - 4.5 g/dL    A-G Ratio 1.3 1.2 - 2.2    Bilirubin, total 0.3 0.0 - 1.2 mg/dL    Alk.  phosphatase 118 44 - 121 IU/L    AST (SGOT) 20 0 - 40 IU/L    ALT (SGPT) 22 0 - 32 IU/L   HEMOGLOBIN A1C W/O EAG   Result Value Ref Range    Hemoglobin A1c 6.8 (H) 4.8 - 5.6 %   LIPID PANEL   Result Value Ref Range    Cholesterol, total 205 (H) 100 - 199 mg/dL    Triglyceride 200 (H) 0 - 149 mg/dL    HDL Cholesterol 53 >39 mg/dL    VLDL, calculated 35 5 - 40 mg/dL    LDL, calculated 117 (H) 0 - 99 mg/dL   T4, FREE   Result Value Ref Range    T4, Free 1.04 0.82 - 1.77 ng/dL   TSH 3RD GENERATION   Result Value Ref Range    TSH 3.650 0.450 - 4.500 uIU/mL   ANTINUCLEAR ANTIBODIES, IFA   Result Value Ref Range    Antinuclear Abs, IFA Negative    RHEUMATOID FACTOR, QL   Result Value Ref Range    Rheumatoid factor <10.0 <14.0 IU/mL   IRON   Result Value Ref Range    Iron 45 27 - 159 ug/dL   VITAMIN D, 25 HYDROXY   Result Value Ref Range    VITAMIN D, 25-HYDROXY 12.2 (L) 30.0 - 100.0 ng/mL       Hemoglobin A1C   Date Value Ref Range Status   10/21/2022 11.1 (H) 4.8 - 5.6 % Final     Lab Results   Component Value Date     10/21/2022    K 3.9 10/21/2022     10/21/2022    CO2 27 10/21/2022    BUN 7 10/21/2022    CREATININE 0.80 10/21/2022    GLUCOSE 185 (H) 10/21/2022    CALCIUM 9.6 10/21/2022    PROT 7.4 10/21/2022    LABALBU 3.3 (L) 10/21/2022    BILITOT 0.3 10/21/2022    ALKPHOS 110 10/21/2022    AST 48 (H) 10/21/2022    ALT 48 10/21/2022    LABGLOM >60 10/21/2022    GFRAA 101 01/13/2022    AGRATIO 1.3 01/13/2022    GLOB 4.1 10/21/2022       Lab Results   Component Value Date    CHOL 178 10/21/2022    CHOL 205 (H) 01/13/2022    CHOL 181 09/02/2021     Lab Results   Component Value Date    TRIG 132 10/21/2022    TRIG 200 (H) 01/13/2022    TRIG 120 09/02/2021     Lab Results   Component Value Date    HDL 47 10/21/2022    HDL 53 01/13/2022    HDL 60 09/02/2021     Lab Results   Component Value Date    LDLCALC 104.6 (H) 10/21/2022    LDLCALC 117 (H) 01/13/2022    LDLCALC 100 (H) 09/02/2021     Lab Results   Component Value Date    LABVLDL 26.4 (H) 10/21/2022    LABVLDL 21.2 06/25/2020    LABVLDL 33.2 (H) 03/20/2020    VLDL 35 01/13/2022    VLDL 21 09/02/2021    VLDL 22 03/26/2021     Lab Results   Component Value Date    CHOLHDLRATIO 3.8 10/21/2022    CHOLHDLRATIO 3.6 06/25/2020    CHOLHDLRATIO 4.3 03/20/2020     Mammogram Result (most recent):  JAJA DIGITAL DIAGNOSTIC W OR WO CAD LEFT 07/22/2019    Narrative  This is a summary report. The complete report is available in the patient's medical record. If you cannot access the medical record, please contact the sending organization for a detailed fax or copy.     DIAGNOSTIC DIGITAL left  MAMMOGRAPHY AND left BREAST ULTRASOUND    CLINICAL INDICATION: Additional evaluation of possible mass in the left lateral  mid breast on new baseline with no personal malignancy or breast surgery    COMPARISON: 7/5/2019    FINDINGS:    Mammogram: Digital diagnostic mammography was performed, and is interpreted in  conjunction with a computer assisted detection (CAD) system. Additional left mediolateral and compression magnification views demonstrate  persistence of a subcentimeter isodense oval to lobulated partially obscured  mass in the slightly lower outer quadrant at middle depth, 3:00-4:00. Recommend  ultrasound to further evaluate. There are scattered benign calcifications with  no suspicious grouping. Ultrasound: Real time targeted sonography was performed of the left breast  3:00-4:00 axes, by the radiologist and sonographer. Corresponding to the  mammogram at 3:30 position 8 cm from the nipple is a benign 0.7 cm simple cyst.  There is no suspicious solid mass, shadowing, hypervascularity, or  lymphadenopathy. Otherwise the 3:00-4:00 axes demonstrate a few other tiny  benign cysts but no concerning abnormality. Impression  IMPRESSION: Left lateral small benign cysts. No evidence of malignancy. Recommend screening mammography in one year. A reminder letter will be  scheduled. This was discussed in full with the patient at the time of  interpretation. BI-RADS Assessment Category 2: Benign finding    We discussed the expected course, resolution and complications of the diagnosis(es) in detail. Medication risks, benefits, costs, interactions, and alternatives were discussed as indicated. I advised her to contact the office if her condition worsens, changes or fails to improve as anticipated. She expressed understanding with the diagnosis(es) and plan. Follow-up and Dispositions    Return in about 3 months  physical/pap         Yvonne Olivia MD

## 2023-03-09 DIAGNOSIS — E78.5 HYPERLIPIDEMIA, UNSPECIFIED HYPERLIPIDEMIA TYPE: ICD-10-CM

## 2023-03-09 DIAGNOSIS — E13.649 UNCONTROLLED DIABETES MELLITUS OF OTHER TYPE WITH HYPOGLYCEMIA, UNSPECIFIED HYPOGLYCEMIA COMA STATUS (HCC): ICD-10-CM

## 2023-03-10 ENCOUNTER — HOSPITAL ENCOUNTER (EMERGENCY)
Age: 48
Discharge: HOME | End: 2023-03-10
Attending: EMERGENCY MEDICINE
Payer: COMMERCIAL

## 2023-03-10 ENCOUNTER — APPOINTMENT (OUTPATIENT)
Dept: GENERAL RADIOLOGY | Age: 48
End: 2023-03-10
Payer: COMMERCIAL

## 2023-03-10 VITALS
TEMPERATURE: 98.2 F | DIASTOLIC BLOOD PRESSURE: 112 MMHG | HEART RATE: 105 BPM | WEIGHT: 280 LBS | HEIGHT: 65 IN | BODY MASS INDEX: 46.65 KG/M2 | RESPIRATION RATE: 18 BRPM | OXYGEN SATURATION: 93 % | SYSTOLIC BLOOD PRESSURE: 151 MMHG

## 2023-03-10 DIAGNOSIS — S42.291A CLOSED FRACTURE OF HEAD OF RIGHT HUMERUS, INITIAL ENCOUNTER: Primary | ICD-10-CM

## 2023-03-10 LAB
ALBUMIN SERPL-MCNC: 3.5 G/DL (ref 3.5–5)
ALBUMIN/GLOB SERPL: 0.8 (ref 0.4–1.6)
ALP SERPL-CCNC: 144 U/L (ref 50–136)
ALT SERPL-CCNC: 23 U/L (ref 12–65)
ANION GAP SERPL CALC-SCNC: 8 MMOL/L (ref 2–11)
AST SERPL-CCNC: 15 U/L (ref 15–37)
BILIRUB SERPL-MCNC: 0.7 MG/DL (ref 0.2–1.1)
BUN SERPL-MCNC: 10 MG/DL (ref 6–23)
CALCIUM SERPL-MCNC: 9.2 MG/DL (ref 8.3–10.4)
CHLORIDE SERPL-SCNC: 104 MMOL/L (ref 101–110)
CHOLEST SERPL-MCNC: 202 MG/DL
CO2 SERPL-SCNC: 25 MMOL/L (ref 21–32)
CREAT SERPL-MCNC: 0.9 MG/DL (ref 0.6–1)
CREAT UR-MCNC: >400 MG/DL
EST. AVERAGE GLUCOSE BLD GHB EST-MCNC: 148 MG/DL
GLOBULIN SER CALC-MCNC: 4.6 G/DL (ref 2.8–4.5)
GLUCOSE SERPL-MCNC: 139 MG/DL (ref 65–100)
HBA1C MFR BLD: 6.8 % (ref 4.8–5.6)
HDLC SERPL-MCNC: 60 MG/DL (ref 40–60)
HDLC SERPL: 3.4
LDLC SERPL CALC-MCNC: 116.2 MG/DL
MICROALBUMIN UR-MCNC: 17.2 MG/DL (ref 0–3)
MICROALBUMIN/CREAT UR-RTO: ABNORMAL MG/G (ref 0–30)
POTASSIUM SERPL-SCNC: 4.2 MMOL/L (ref 3.5–5.1)
PROT SERPL-MCNC: 8.1 G/DL (ref 6.3–8.2)
SODIUM SERPL-SCNC: 137 MMOL/L (ref 133–143)
TRIGL SERPL-MCNC: 129 MG/DL (ref 35–150)
VLDLC SERPL CALC-MCNC: 25.8 MG/DL (ref 6–23)

## 2023-03-10 PROCEDURE — 73030 X-RAY EXAM OF SHOULDER: CPT

## 2023-03-10 PROCEDURE — 6360000002 HC RX W HCPCS: Performed by: EMERGENCY MEDICINE

## 2023-03-10 PROCEDURE — 96372 THER/PROPH/DIAG INJ SC/IM: CPT

## 2023-03-10 PROCEDURE — 99284 EMERGENCY DEPT VISIT MOD MDM: CPT

## 2023-03-10 PROCEDURE — 6370000000 HC RX 637 (ALT 250 FOR IP): Performed by: EMERGENCY MEDICINE

## 2023-03-10 RX ORDER — MORPHINE SULFATE 15 MG/1
7.5 TABLET ORAL EVERY 6 HOURS PRN
Qty: 4 TABLET | Refills: 0 | Status: SHIPPED | OUTPATIENT
Start: 2023-03-10 | End: 2023-03-12

## 2023-03-10 RX ORDER — ACETAMINOPHEN 325 MG/1
650 TABLET ORAL
Status: COMPLETED | OUTPATIENT
Start: 2023-03-10 | End: 2023-03-10

## 2023-03-10 RX ORDER — MORPHINE SULFATE 4 MG/ML
4 INJECTION INTRAVENOUS
Status: COMPLETED | OUTPATIENT
Start: 2023-03-10 | End: 2023-03-10

## 2023-03-10 RX ORDER — KETOROLAC TROMETHAMINE 30 MG/ML
30 INJECTION, SOLUTION INTRAMUSCULAR; INTRAVENOUS
Status: DISCONTINUED | OUTPATIENT
Start: 2023-03-10 | End: 2023-03-10 | Stop reason: HOSPADM

## 2023-03-10 RX ADMIN — MORPHINE SULFATE 4 MG: 4 INJECTION, SOLUTION INTRAMUSCULAR; INTRAVENOUS at 18:36

## 2023-03-10 RX ADMIN — ACETAMINOPHEN 650 MG: 325 TABLET ORAL at 18:35

## 2023-03-10 ASSESSMENT — PAIN DESCRIPTION - ORIENTATION
ORIENTATION: RIGHT
ORIENTATION: RIGHT

## 2023-03-10 ASSESSMENT — PAIN - FUNCTIONAL ASSESSMENT
PAIN_FUNCTIONAL_ASSESSMENT: 0-10
PAIN_FUNCTIONAL_ASSESSMENT: 0-10

## 2023-03-10 ASSESSMENT — LIFESTYLE VARIABLES
HOW MANY STANDARD DRINKS CONTAINING ALCOHOL DO YOU HAVE ON A TYPICAL DAY: 1 OR 2
HOW OFTEN DO YOU HAVE A DRINK CONTAINING ALCOHOL: MONTHLY OR LESS

## 2023-03-10 ASSESSMENT — PAIN SCALES - GENERAL
PAINLEVEL_OUTOF10: 10
PAINLEVEL_OUTOF10: 9

## 2023-03-10 ASSESSMENT — ENCOUNTER SYMPTOMS
NAUSEA: 0
SHORTNESS OF BREATH: 0
DIARRHEA: 1
BACK PAIN: 0
VOMITING: 0
COUGH: 0

## 2023-03-10 ASSESSMENT — PAIN DESCRIPTION - LOCATION
LOCATION: SHOULDER

## 2023-03-10 ASSESSMENT — PAIN DESCRIPTION - DESCRIPTORS: DESCRIPTORS: ACHING

## 2023-03-10 NOTE — ED TRIAGE NOTES
Pt reports right shoulder pain that started today. Reports numbness and tingling all the way  down right arm. Pt reports not being able to lift arm. Pt denies any numbness or tingling noted anyway else. Pt reports right upper arm is swollen. Denies Cp. MD notified of pt s/s. MD at bedside.

## 2023-03-10 NOTE — ED PROVIDER NOTES
Emergency Department Provider Note                   PCP:                Joi Benavidez MD               Age: 52 y.o. Sex: female     DISPOSITION Decision To Discharge 03/10/2023 07:20:12 PM       ICD-10-CM    1. Closed fracture of head of right humerus, initial encounter  S42.291A morphine (MSIR) 15 MG tablet          MEDICAL DECISION MAKING  Complexity of Problems Addressed:  1 acute illness/injury in need of evaluation    Data Reviewed and Analyzed:  Category 1:   I reviewed records from an external source: provider visit notes from PCP. I ordered each unique test.  I reviewed the results of each unique test.        Category 2:   I independently ordered and reviewed the X-rays. Probable right greater trochanter fracture, no dislocation    Category 3: Discussion of management or test interpretation. Plan on orthopedic follow-up and placing patient in a sling for greater trochanter fracture. Awaiting radiology confirmation of my x-rayed read prior to discharge. Risk of Complications and/or Morbidity of Patient Management:  Prescription drug management performed, parenteral pain medication provided in ED as well. Alba Christina is a 52 y.o. female who presents to the Emergency Department with chief complaint of    Chief Complaint   Patient presents with    Shoulder Pain      Pain in the right shoulder radiating down right arm as she leaned over her bed to change the sheets. She was on her elbows when the injury and pain occurred. She has some numbness in the dorsal aspect of her right distal forearm as a result. She denies other injury. She denies prior shoulder surgery or shoulder issues. She describes what sounds like a nursemaid's elbow which she described as \"my arm was dislocated when I was little\". The history is provided by the patient. Review of Systems   Constitutional:  Negative for chills and fever. Respiratory:  Negative for cough and shortness of breath. Cardiovascular:  Negative for chest pain. Gastrointestinal:  Positive for diarrhea. Negative for nausea and vomiting. Musculoskeletal:  Negative for back pain and neck pain. Neurological:  Positive for numbness. Negative for weakness. Vitals signs and nursing note reviewed. Patient Vitals for the past 4 hrs:   Temp Pulse Resp BP SpO2   03/10/23 1918 -- (!) 105 17 (!) 153/109 94 %   03/10/23 1805 98.1 °F (36.7 °C) (!) 122 20 (!) 153/111 100 %          Physical Exam  Vitals and nursing note reviewed. Constitutional:       Appearance: Normal appearance. HENT:      Head: Normocephalic and atraumatic. Nose: Nose normal.      Mouth/Throat:      Mouth: Mucous membranes are moist.   Eyes:      Conjunctiva/sclera: Conjunctivae normal.      Pupils: Pupils are equal, round, and reactive to light. Cardiovascular:      Rate and Rhythm: Regular rhythm. Tachycardia present. Pulses: Normal pulses. Heart sounds: Normal heart sounds. Pulmonary:      Effort: Pulmonary effort is normal.      Breath sounds: Normal breath sounds. Abdominal:      General: There is no distension. Palpations: Abdomen is soft. Tenderness: There is no abdominal tenderness. There is no guarding or rebound. Musculoskeletal:         General: No tenderness. Right lower leg: No edema. Left lower leg: No edema. Comments: Range of motion right shoulder secondary to pain, pain with internal and external rotation flexion and abduction. I do not appreciate much in the way of tenderness on exam.  I do not appreciate glenohumeral deformity. Skin:     General: Skin is warm and dry. Neurological:      Mental Status: She is alert and oriented to person, place, and time. Cranial Nerves: No cranial nerve deficit. Sensory: No sensory deficit. Motor: No weakness.    Psychiatric:         Behavior: Behavior normal.        Procedures     Orders Placed This Encounter   Procedures    XR SHOULDER RIGHT (MIN 2 VIEWS)    [facility] Arm Sling, Right; L        Medications   ketorolac (TORADOL) injection 30 mg (30 mg IntraMUSCular Not Given 3/10/23 1839)   acetaminophen (TYLENOL) tablet 650 mg (650 mg Oral Given 3/10/23 1835)   morphine injection 4 mg (4 mg IntraMUSCular Given 3/10/23 1836)       New Prescriptions    MORPHINE (MSIR) 15 MG TABLET    Take 0.5 tablets by mouth every 6 hours as needed for Pain for up to 2 days.  Max Daily Amount: 30 mg        Past Medical History:   Diagnosis Date    Asthma     Chronic right-sided low back pain with right-sided sciatica 3/20/2020    DDD (degenerative disc disease), lumbar 8/25/2020    Hepatitis C virus infection without hepatic coma     Hepatitis C virus infection without hepatic coma     Hepatitis C virus infection without hepatic coma     History of hepatitis C virus infection     History of hepatitis C virus infection     History of hepatitis C virus infection     Other ill-defined conditions(799.89)     fibra myalgia    Primary hypertension 4/18/2022    Psychiatric disorder     depression/anxiety    Uncontrolled type 2 diabetes mellitus with hyperglycemia ([facility] [address] 6/26/2020        Past Surgical History:   Procedure Laterality Date    GYN      laparoscopy times 4    HEENT      jaw        Family History   Problem Relation Age of Onset    Breast Cancer Maternal Grandmother         Social History     Socioeconomic History    Marital status: Single   Tobacco Use    Smoking status: Former    Smokeless tobacco: Never   Substance and Sexual Activity    Alcohol use: Yes    Drug use: Yes     Types: Opiates      Social Determinants of Health     Financial Resource Strain: Low Risk     Difficulty of Paying Living Expenses: Not hard at all   Food Insecurity: No Food Insecurity    Worried About Running Out of Food in the Last Year: Never true    Ran Out of Food in the Last Year: Never true   Transportation Needs: No Transportation Needs    Lack of Transportation (Medical): No    Lack of Transportation (Non-Medical): No   Physical Activity: Insufficiently Active    Days of Exercise per Week: 3 days    Minutes of Exercise per Session: 30 min   Stress: No Stress Concern Present    Feeling of Stress : Only a little   Social Connections: Moderately Isolated    Frequency of Communication with Friends and Family: More than three times a week    Frequency of Social Gatherings with Friends and Family: More than three times a week    Attends Denominational Services: Never    Active Member of Clubs or Organizations: Yes    Attends Club or Organization Meetings: More than 4 times per year    Marital Status: Never    Intimate Partner Violence: Not At Risk    Fear of Current or Ex-Partner: No    Emotionally Abused: No    Physically Abused: No    Sexually Abused: No   Housing Stability: Low Risk     Unable to Pay for Housing in the Last Year: No    Number of [place] in the Last Year: 1    Unstable Housing in the Last Year: No        Allergies: Hydroxyzine pamoate, Ketorolac tromethamine, Haloperidol lactate, Penicillins, and Rofecoxib    Previous Medications    ALBUTEROL SULFATE HFA (PROVENTIL;VENTOLIN;PROAIR) 108 (90 BASE) MCG/ACT INHALER    Inhale 2 puffs into the lungs every 4 hours as needed for Wheezing or Shortness of Breath    ARIPIPRAZOLE (ABILIFY) 20 MG TABLET    Take 20 mg by mouth daily    ATORVASTATIN (LIPITOR) 10 MG TABLET    Take 1 tablet by mouth daily    BUPRENORPHINE-NALOXONE (SUBOXONE) 8-2 MG FILM SL FILM    Place 1 Film under the tongue daily.     ERGOCALCIFEROL (ERGOCALCIFEROL) 1.25 MG (61156 UT) CAPSULE    Take 50,000 Units by mouth every 7 days    FLUOXETINE (PROZAC) 20 MG CAPSULE    Take 60 mg by mouth daily    FLUTICASONE-VILANTEROL (BREO ELLIPTA) 100-25 MCG/INH AEPB INHALER    Inhale 1 puff into the lungs daily    LISINOPRIL (PRINIVIL;ZESTRIL) 2.5 MG TABLET    Take 1 tablet by mouth daily    METFORMIN (GLUCOPHAGE-XR) 500 MG EXTENDED RELEASE TABLET    Take 2 tablets by mouth daily Dose increased to 1000 mg on 11/3/22        Results for orders placed or performed during the hospital encounter of 03/10/23   XR SHOULDER RIGHT (MIN 2 VIEWS)    Narrative    Examination: XR SHOULDER RIGHT (MIN 2 VIEWS)    Indication: Pain. Fall. Comparison: No prior study is available for comparison. Findings: There is a nondisplaced, longitudinally oriented acute fracture of the greater   tuberosity. The fracture line extends into the proximal diaphysis. The humeral   head remains seated over the glenoid. The acromioclavicular joint is   unremarkable. Impression    Impression:  Nondisplaced acute fracture of the greater tuberosity. Ayah Maciel M.D.   3/10/2023 7:22:00 PM        XR SHOULDER RIGHT (MIN 2 VIEWS)   Final Result   Impression:   Nondisplaced acute fracture of the greater tuberosity. Ayah Maciel M.D.    3/10/2023 7:22:00 PM                        Voice dictation software was used during the making of this note. This software is not perfect and grammatical and other typographical errors may be present. This note has not been completely proofread for errors.      Bisi Estevez MD  03/10/23 1924

## 2023-03-11 NOTE — DISCHARGE INSTRUCTIONS
Tylenol and ibuprofen for pain. Morphine 1/2 tablet every 6-8 hours as needed for breakthrough pain. Stop your Suboxone for 2 days and take morphine instead if needed, however if Tylenol and Motrin and your Suboxone are adequate you may continue this for pain. Ice to the area for 20 minutes every 4-6 hours while awake for 3 to 5 days. Follow-up with [address] [place] [address] orthopedics when called with appointment time. Call them Tuesday if you have not heard from them regarding this appointment. Wear the sling at all times except when bathing until seen in follow-up.

## 2023-03-14 ENCOUNTER — OFFICE VISIT (OUTPATIENT)
Dept: ORTHOPEDIC SURGERY | Age: 48
End: 2023-03-14
Payer: COMMERCIAL

## 2023-03-14 DIAGNOSIS — S42.254A CLOSED NONDISPLACED FRACTURE OF GREATER TUBEROSITY OF RIGHT HUMERUS, INITIAL ENCOUNTER: Primary | ICD-10-CM

## 2023-03-14 PROCEDURE — 99203 OFFICE O/P NEW LOW 30 MIN: CPT | Performed by: STUDENT IN AN ORGANIZED HEALTH CARE EDUCATION/TRAINING PROGRAM

## 2023-03-14 RX ORDER — NALOXONE HYDROCHLORIDE 4 MG/.1ML
1 SPRAY NASAL PRN
Qty: 1 EACH | Refills: 1 | Status: SHIPPED | OUTPATIENT
Start: 2023-03-14

## 2023-03-14 RX ORDER — HYDROCODONE BITARTRATE AND ACETAMINOPHEN 5; 325 MG/1; MG/1
1 TABLET ORAL EVERY 6 HOURS PRN
Qty: 12 TABLET | Refills: 0 | Status: SHIPPED | OUTPATIENT
Start: 2023-03-14 | End: 2023-03-14

## 2023-03-14 RX ORDER — HYDROCODONE BITARTRATE AND ACETAMINOPHEN 5; 325 MG/1; MG/1
1 TABLET ORAL EVERY 6 HOURS PRN
Qty: 12 TABLET | Refills: 0 | Status: SHIPPED | OUTPATIENT
Start: 2023-03-14 | End: 2023-03-17

## 2023-03-14 NOTE — PROGRESS NOTES
Name: Kurt Bray  YOB: 1975  Gender: female  MRN: [id number]  Date of Encounter:  3/14/2023       CHIEF COMPLAINT:     Chief Complaint   Patient presents with    Shoulder Pain     Right humerus fx        SUBJECTIVE/OBJECTIVE:      HPI:    Patient is a 52 y.o. pleasant female who presents today for a new evaluation of her right shoulder pain. She reports having a fall from ground level, on 3/10/23 falling forward and catching herself. She initially did not think she was hurt, but then made her bed and had an increase in her pain. She was given 4 tablets of morphine from the emergency department. She immediately asked for something for pain during her encounter. She has been taking Tylenol. Patient does not give extensive history. PAST HISTORY:   Past medical, surgical, family, social history and allergies reviewed by me. Pertinent history: prediabetes, HTN  Tobacco use: smokes and drinks \"on holidays;\" denies drug use. Review of PDMP shows that patient takes suboxone      REVIEW OF SYSTEMS:   As noted in HPI. PHYSICAL EXAMINATION:     Gen: Well-developed, no acute distress   HEENT: NC/AT, EOMI   Neck: Trachea midline, normal ROM   CV: Regular rhythm by palpation of distal pulse, normal capillary refill   Pulm: No respiratory distress, no stridor   Psychiatric: Well oriented, normal mood and affect. Skin: No rashes, lesions or ulcers, normal temperature, turgor, and texture on uninvolved extremity. ORTHO EXAM:    Right Shoulder:     Tenderness palpation of proximal humerus. AC joint nontender. No visible or palpable defect, no significant ecchymosis. Mild soft tissue swelling. Initiates external and internal range of motion, 5/5  strength. Full range of motion and no tenderness of elbow. 2+ radial pulse.     DIAGNOSTIC IMAGING:     X-ray 3 vw RIGHT shoulder AP external / AP internal rotation / scapular Y taken previously in ER    Findings: Nondisplaced fracture of greater tuberosity. No degenerative changes of glenohumeral or AC joint. Normal alignment. Impression: Nondisplaced fracture of greater tuberosity    I have independently reviewed x-rays. ASSESSMENT/PLAN:   1. Closed nondisplaced fracture of greater tuberosity of right humerus, initial encounter           Patient already finished pain medication prescribed for fracture. She takes suboxone, but did not state that during her encounter. That said, she does have a fracture to the proximal humerus. I have advised her we would prescribe a small dose of pain medication, but that she should continue NSAIDs / tylenol Prn pain. I will not prescribe another dosage given her medication history. Patient left room before she was provided range of motion activities. She was advised to continue sling use for 2 weeks and begin gentle shoulder ROM. Advised to follow up in 3 weeks for reevaluation / repeat XR. Orders / medications today:   Orders Placed This Encounter    DISCONTD: HYDROcodone-acetaminophen (NORCO) 5-325 MG per tablet     Sig: Take 1 tablet by mouth every 6 hours as needed for Pain for up to 3 days. Intended supply: 5 days. Take lowest dose possible to manage pain Max Daily Amount: 4 tablets     Dispense:  12 tablet     Refill:  0     Reduce doses taken as pain becomes manageable    naloxone (NARCAN) 4 MG/0.1ML LIQD nasal spray     Si spray by Nasal route as needed for Opioid Reversal     Dispense:  1 each     Refill:  1    HYDROcodone-acetaminophen (NORCO) 5-325 MG per tablet     Sig: Take 1 tablet by mouth every 6 hours as needed for Pain for up to 3 days. Intended supply: 5 days. Take lowest dose possible to manage pain Max Daily Amount: 4 tablets     Dispense:  12 tablet     Refill:  0     Reduce doses taken as pain becomes manageable      Follow up: Return in about 3 weeks (around 2023). The patient expressed understanding and agreed with the plan.      Obdulia Lang MD Orthopaedics and [address] [place] [address],[address] Floor     This document was created using voice recognition software so frequent mistakes are possible. For any concerns about the wording of this document, please contact its creator for further clarification.

## 2023-03-15 ENCOUNTER — HOSPITAL ENCOUNTER (EMERGENCY)
Age: 48
Discharge: HOME | End: 2023-03-16
Attending: EMERGENCY MEDICINE
Payer: COMMERCIAL

## 2023-03-15 DIAGNOSIS — M79.601 RIGHT ARM PAIN: ICD-10-CM

## 2023-03-15 DIAGNOSIS — N94.6 DYSMENORRHEA: Primary | ICD-10-CM

## 2023-03-15 LAB
ALBUMIN SERPL-MCNC: 4.3 G/DL (ref 3.5–5)
ALBUMIN/GLOB SERPL: 1 (ref 0.4–1.6)
ALP SERPL-CCNC: 141 U/L (ref 45–117)
ALT SERPL-CCNC: 19 U/L (ref 13–61)
ANION GAP SERPL CALC-SCNC: 12 MMOL/L (ref 2–11)
AST SERPL-CCNC: 20 U/L (ref 15–37)
BASOPHILS # BLD: 0 K/UL (ref 0–0.2)
BASOPHILS NFR BLD: 0 % (ref 0–2)
BILIRUB SERPL-MCNC: 0.4 MG/DL (ref 0.2–1.1)
BUN SERPL-MCNC: 8 MG/DL (ref 7–18)
CALCIUM SERPL-MCNC: 9.7 MG/DL (ref 8.3–10.4)
CHLORIDE SERPL-SCNC: 100 MMOL/L (ref 98–107)
CO2 SERPL-SCNC: 26 MMOL/L (ref 21–32)
CREAT SERPL-MCNC: 0.78 MG/DL (ref 0.6–1)
DIFFERENTIAL METHOD BLD: ABNORMAL
EOSINOPHIL # BLD: 0.2 K/UL (ref 0–0.8)
EOSINOPHIL NFR BLD: 1 % (ref 0.5–7.8)
ERYTHROCYTE [DISTWIDTH] IN BLOOD BY AUTOMATED COUNT: 14.2 % (ref 11.9–14.6)
GLOBULIN SER CALC-MCNC: 4.1 G/DL (ref 2.8–4.5)
GLUCOSE SERPL-MCNC: 222 MG/DL (ref 65–100)
HCT VFR BLD AUTO: 41.4 % (ref 35.8–46.3)
HGB BLD-MCNC: 13.3 G/DL (ref 11.7–15.4)
IMM GRANULOCYTES # BLD AUTO: 0.1 K/UL (ref 0–0.5)
IMM GRANULOCYTES NFR BLD AUTO: 1 % (ref 0–5)
LIPASE SERPL-CCNC: 14 U/L (ref 13–60)
LYMPHOCYTES # BLD: 1.9 K/UL (ref 0.5–4.6)
LYMPHOCYTES NFR BLD: 15 % (ref 13–44)
MCH RBC QN AUTO: 26.3 PG (ref 26.1–32.9)
MCHC RBC AUTO-ENTMCNC: 32.1 G/DL (ref 31.4–35)
MCV RBC AUTO: 81.8 FL (ref 82–102)
MONOCYTES # BLD: 0.4 K/UL (ref 0.1–1.3)
MONOCYTES NFR BLD: 3 % (ref 4–12)
NEUTS SEG # BLD: 10.5 K/UL (ref 1.7–8.2)
NEUTS SEG NFR BLD: 81 % (ref 43–78)
NRBC # BLD: 0 K/UL (ref 0–0.2)
PLATELET # BLD AUTO: 299 K/UL (ref 150–450)
PMV BLD AUTO: 9.2 FL (ref 9.4–12.3)
POTASSIUM SERPL-SCNC: 4.7 MMOL/L (ref 3.5–5.1)
PROT SERPL-MCNC: 8.4 G/DL (ref 6.4–8.2)
RBC # BLD AUTO: 5.06 M/UL (ref 4.05–5.2)
SODIUM SERPL-SCNC: 138 MMOL/L (ref 133–143)
TROPONIN T SERPL HS-MCNC: <6 NG/L (ref 0–14)
WBC # BLD AUTO: 12.9 K/UL (ref 4.3–11.1)

## 2023-03-15 PROCEDURE — 96374 THER/PROPH/DIAG INJ IV PUSH: CPT

## 2023-03-15 PROCEDURE — 81025 URINE PREGNANCY TEST: CPT

## 2023-03-15 PROCEDURE — 84484 ASSAY OF TROPONIN QUANT: CPT

## 2023-03-15 PROCEDURE — 83690 ASSAY OF LIPASE: CPT

## 2023-03-15 PROCEDURE — 96375 TX/PRO/DX INJ NEW DRUG ADDON: CPT

## 2023-03-15 PROCEDURE — 99285 EMERGENCY DEPT VISIT HI MDM: CPT

## 2023-03-15 PROCEDURE — 80053 COMPREHEN METABOLIC PANEL: CPT

## 2023-03-15 PROCEDURE — 85025 COMPLETE CBC W/AUTO DIFF WBC: CPT

## 2023-03-15 RX ORDER — ONDANSETRON 2 MG/ML
4 INJECTION INTRAMUSCULAR; INTRAVENOUS
Status: COMPLETED | OUTPATIENT
Start: 2023-03-16 | End: 2023-03-16

## 2023-03-15 RX ORDER — MORPHINE SULFATE 2 MG/ML
2 INJECTION, SOLUTION INTRAMUSCULAR; INTRAVENOUS ONCE
Status: COMPLETED | OUTPATIENT
Start: 2023-03-16 | End: 2023-03-16

## 2023-03-15 RX ORDER — 0.9 % SODIUM CHLORIDE 0.9 %
1000 INTRAVENOUS SOLUTION INTRAVENOUS
Status: COMPLETED | OUTPATIENT
Start: 2023-03-16 | End: 2023-03-16

## 2023-03-15 ASSESSMENT — PAIN - FUNCTIONAL ASSESSMENT
PAIN_FUNCTIONAL_ASSESSMENT: 0-10
PAIN_FUNCTIONAL_ASSESSMENT: 0-10

## 2023-03-15 ASSESSMENT — PAIN SCALES - GENERAL
PAINLEVEL_OUTOF10: 10
PAINLEVEL_OUTOF10: 10

## 2023-03-16 ENCOUNTER — APPOINTMENT (OUTPATIENT)
Dept: CT IMAGING | Age: 48
End: 2023-03-16
Payer: COMMERCIAL

## 2023-03-16 VITALS
HEART RATE: 100 BPM | BODY MASS INDEX: 46.65 KG/M2 | WEIGHT: 280 LBS | OXYGEN SATURATION: 94 % | TEMPERATURE: 98.9 F | HEIGHT: 65 IN | SYSTOLIC BLOOD PRESSURE: 142 MMHG | DIASTOLIC BLOOD PRESSURE: 101 MMHG | RESPIRATION RATE: 27 BRPM

## 2023-03-16 LAB
AMPHET UR QL SCN: POSITIVE
APPEARANCE UR: ABNORMAL
BACTERIA URNS QL MICRO: NORMAL /HPF
BARBITURATES UR QL SCN: NEGATIVE
BENZODIAZ UR QL: NEGATIVE
BILIRUB UR QL: ABNORMAL
CANNABINOIDS UR QL SCN: POSITIVE
CASTS URNS QL MICRO: 0 /LPF
COCAINE UR QL SCN: NEGATIVE
COLOR UR: ABNORMAL
CRYSTALS URNS QL MICRO: 0 /LPF
EKG ATRIAL RATE: 78 BPM
EKG DIAGNOSIS: NORMAL
EKG P AXIS: 47 DEGREES
EKG P-R INTERVAL: 166 MS
EKG Q-T INTERVAL: 348 MS
EKG QRS DURATION: 93 MS
EKG QTC CALCULATION (BAZETT): 392 MS
EKG R AXIS: 66 DEGREES
EKG T AXIS: 65 DEGREES
EKG VENTRICULAR RATE: 76 BPM
EPI CELLS #/AREA URNS HPF: NORMAL /HPF
GLUCOSE UR STRIP.AUTO-MCNC: NEGATIVE MG/DL
HCG UR QL: NEGATIVE
HGB UR QL STRIP: ABNORMAL
KETONES UR QL STRIP.AUTO: NEGATIVE MG/DL
LEUKOCYTE ESTERASE UR QL STRIP.AUTO: ABNORMAL
METHADONE UR QL: NEGATIVE
MUCOUS THREADS URNS QL MICRO: 0 /LPF
NITRITE UR QL STRIP.AUTO: NEGATIVE
OPIATES UR QL: POSITIVE
OTHER OBSERVATIONS: NORMAL
PCP UR QL: NEGATIVE
PH UR STRIP: 6 (ref 5–9)
PROT UR STRIP-MCNC: 100 MG/DL
RBC #/AREA URNS HPF: >100 /HPF
SP GR UR REFRACTOMETRY: 1.01 (ref 1–1.02)
UROBILINOGEN UR QL STRIP.AUTO: 0.2 EU/DL (ref 0.2–1)
WBC URNS QL MICRO: NORMAL /HPF

## 2023-03-16 PROCEDURE — 81001 URINALYSIS AUTO W/SCOPE: CPT

## 2023-03-16 PROCEDURE — 6360000004 HC RX CONTRAST MEDICATION: Performed by: EMERGENCY MEDICINE

## 2023-03-16 PROCEDURE — 74177 CT ABD & PELVIS W/CONTRAST: CPT

## 2023-03-16 PROCEDURE — 6360000002 HC RX W HCPCS: Performed by: EMERGENCY MEDICINE

## 2023-03-16 PROCEDURE — 2580000003 HC RX 258: Performed by: EMERGENCY MEDICINE

## 2023-03-16 PROCEDURE — 80307 DRUG TEST PRSMV CHEM ANLYZR: CPT

## 2023-03-16 RX ORDER — DIFLUNISAL 500 MG/1
500 TABLET, FILM COATED ORAL 2 TIMES DAILY
Qty: 30 TABLET | Refills: 0 | Status: SHIPPED | OUTPATIENT
Start: 2023-03-16 | End: 2023-03-31

## 2023-03-16 RX ORDER — 0.9 % SODIUM CHLORIDE 0.9 %
100 INTRAVENOUS SOLUTION INTRAVENOUS ONCE
Status: COMPLETED | OUTPATIENT
Start: 2023-03-16 | End: 2023-03-16

## 2023-03-16 RX ADMIN — SODIUM CHLORIDE 1000 ML: 9 INJECTION, SOLUTION INTRAVENOUS at 00:04

## 2023-03-16 RX ADMIN — MORPHINE SULFATE 2 MG: 2 INJECTION, SOLUTION INTRAMUSCULAR; INTRAVENOUS at 00:08

## 2023-03-16 RX ADMIN — SODIUM CHLORIDE 100 ML: 9 INJECTION, SOLUTION INTRAVENOUS at 00:15

## 2023-03-16 RX ADMIN — ONDANSETRON 4 MG: 2 INJECTION INTRAMUSCULAR; INTRAVENOUS at 00:06

## 2023-03-16 RX ADMIN — IOPAMIDOL 100 ML: 755 INJECTION, SOLUTION INTRAVENOUS at 00:15

## 2023-03-16 ASSESSMENT — PAIN SCALES - GENERAL
PAINLEVEL_OUTOF10: 10
PAINLEVEL_OUTOF10: 10

## 2023-03-16 ASSESSMENT — ENCOUNTER SYMPTOMS
EYE PAIN: 0
DIARRHEA: 0
NAUSEA: 1
BLOOD IN STOOL: 0
BACK PAIN: 0
STRIDOR: 0
FACIAL SWELLING: 0
EYE DISCHARGE: 0
EYE REDNESS: 0
WHEEZING: 0
COUGH: 0
ABDOMINAL PAIN: 1
VOMITING: 0
RHINORRHEA: 0
ANAL BLEEDING: 0
CONSTIPATION: 0
SHORTNESS OF BREATH: 0
RECTAL PAIN: 0
ABDOMINAL DISTENTION: 0

## 2023-03-16 NOTE — ED TRIAGE NOTES
Patient arrived ambulatory to ED with complaint of period pain, fibromyalgia pain, and pain to her fractured right arm. Patient took her Lortab 1 hour ago. Patient states she hasn't had her period in 2 months and is bleeding really badly, patient is changing her 1 pad every couple of hours. Patient started rocking back and forth, refusing to let us get her blood pressure and temperature. Patient stopped triage process because she stated she desperately needed to pee. Patient insisted that she was going to pee her pants and that it could not wait. Patient encouraged to finish vital signs to continue proper care. Patient states \"I am going to pee my pants! I have got to go now! \" Patient informed that we would have to move on to the next patient while she goes to the restroom. Patient states \"FINE! I WILL PISS RIGHT HERE IN YOUR FLOOR! AND YOU (pointing to Manju Crowe RN) WILL CLEAN IT UP!!\"    Patient encouraged that her tone and raised voice were not appropriate at this time but we would allow her to go to the restroom once triage was complete. Patient states \"IF YOU DON'T CLEAN IT I WILL MAKE THE  CLEAN IT AND SAY IT IS ALL YOUR FAULT! \"    Donte Acosta informed patient that she was more than welcome to use the restroom after triage was complete, patient continued to scream at Manju Crowe [facility] and [employer], \"Good luck getting me to clean anything up! LET ME OUT OF HERE! \"    Patient was released from triage to use the restroom. Charge nurse notified of situation. Will attempt triage after restroom break.

## 2023-03-16 NOTE — ED NOTES
I have reviewed discharge instructions with the patient. The patient verbalized understanding. Patient left ED via Discharge Method: ambulatory to Home with mother. Opportunity for questions and clarification provided. Patient given 1 scripts. To continue your aftercare when you leave the hospital, you may receive an automated call from our care team to check in on how you are doing. This is a free service and part of our promise to provide the best care and service to meet your aftercare needs.  If you have questions, or wish to unsubscribe from this service please call [phone]. Thank you for Choosing our [facility] Emergency Department.         Pete Padgett RN  03/16/23 [id number]

## 2023-03-16 NOTE — ED TRIAGE NOTES
Reports RLQ abd pain and vaginal bleeding since yesterday. Reports vomiting from pain. Reports changing pa every few hours. Denies urinary s/s. Reports she does not have her right ovary. Left [facility] to come here.  Also reports right shoulder fx x2 days ago and was given sling but is not wearing is because \"her dog ate it\", reports she went to orthopedics and was given lortab with  no relief despite taking two at a time

## 2023-03-16 NOTE — ED PROVIDER NOTES
[facility] Emergency Department Provider Note                   PCP:                Debi Curry MD               Age: 52 y.o. Sex: female       ICD-10-CM    1. Dysmenorrhea  N94.6       2. Right arm pain  M79.601 [employer] Arm Sling, Right; XL          DISPOSITION Decision To Discharge 03/16/2023 01:52:59 AM       New Prescriptions    DIFLUNISAL (DOLOBID) 500 MG TABS    Take 1 tablet by mouth 2 times daily for 15 days       Orders Placed This Encounter   Procedures    CT ABDOMEN PELVIS W IV CONTRAST Additional Contrast? None    Pregnancy, Urine    Urinalysis w rflx microscopic    Troponin T    CBC with Auto Differential    Lipase    Comprehensive Metabolic Panel    Urine Drug Screen    Urinalysis, Micro    Diet NPO    EKG 12 Lead (Select if Upper Abd Pain, or SOB, Diaphoresis or Tachy)    Saline lock IV    Insert peripheral IV    [employer] Arm Sling, Right; XL         Valerie Ramos is a 52 y.o. female who presents to the Emergency Department with chief complaint of    Chief Complaint   Patient presents with    Vaginal Bleeding    Abdominal Pain       Ms. Maryse Bradley is a 44-year-old female with past medical history most significant for morbid obesity, diabetes, hypertension, hyperlipidemia who reports that she is fairly noncompliant with her medication, and who presents with complaint of severe lower abdominal pain associated with onset of a heavier than normal period. However, she also reports a different right lower quadrant abdominal pain with associated tenderness and reports she did have an ovary on that side and she does not normally hurt on that side when she has her periods. She reports she left [facility] because the wait was too long. She also got into an altercation verbally with the nurses and triage and apparently was screaming at them but now seems to have calmed down some.   She also reports pain in her right arm after she broke her humeral head has already been seen a week ago. She has already been seen by orthopedics and a sling was provided. She reports the sling was eaten by her dog and so she needs another 1. Review of Systems   Constitutional:  Negative for chills, diaphoresis, fatigue and fever. HENT:  Negative for congestion, facial swelling, nosebleeds and rhinorrhea. Eyes:  Negative for pain, discharge and redness. Respiratory:  Negative for cough, shortness of breath, wheezing and stridor. Cardiovascular:  Negative for chest pain, palpitations and leg swelling. Gastrointestinal:  Positive for abdominal pain and nausea. Negative for abdominal distention, anal bleeding, blood in stool, constipation, diarrhea, rectal pain and vomiting. Genitourinary:  Positive for menstrual problem. Negative for decreased urine volume, difficulty urinating, dysuria, flank pain, frequency, hematuria, pelvic pain, urgency, vaginal bleeding, vaginal discharge and vaginal pain. Musculoskeletal:  Negative for back pain, gait problem, myalgias and neck pain. Right arm/shoulder pain. Skin:  Negative for pallor, rash and wound. Neurological:  Negative for dizziness, tremors, weakness, light-headedness, numbness and headaches. Psychiatric/Behavioral:  Negative for agitation, behavioral problems, confusion and hallucinations. The patient is not nervous/anxious and is not hyperactive. All other systems reviewed and are negative.       Past Medical History:   Diagnosis Date    Asthma     Chronic right-sided low back pain with right-sided sciatica 3/20/2020    DDD (degenerative disc disease), lumbar 8/25/2020    Hepatitis C virus infection without hepatic coma     Hepatitis C virus infection without hepatic coma     Hepatitis C virus infection without hepatic coma     History of hepatitis C virus infection     History of hepatitis C virus infection     History of hepatitis C virus infection     Other ill-defined conditions(799.89)     fibra myalgia    Primary hypertension 4/18/2022    Psychiatric disorder     depression/anxiety    Uncontrolled type 2 diabetes mellitus with hyperglycemia ([facility] [address] 6/26/2020        Past Surgical History:   Procedure Laterality Date    GYN      laparoscopy times 4    HEENT      jaw        Family History   Problem Relation Age of Onset    Breast Cancer Maternal Grandmother         Social Connections: Moderately Isolated    Frequency of Communication with Friends and Family: More than three times a week    Frequency of Social Gatherings with Friends and Family: More than three times a week    Attends Buddhism Services: Never    Active Member of Clubs or Organizations: Yes    Attends Club or Organization Meetings: More than 4 times per year    Marital Status: Never         Allergies   Allergen Reactions    Hydroxyzine Pamoate Other (See Comments)     \"just makes me feel worse\"    Ketorolac Tromethamine Other (See Comments)     \"it made me feel really worse\"    Haloperidol Lactate Rash    Penicillins Nausea And Vomiting    Rofecoxib Palpitations        Vitals signs and nursing note reviewed. Patient Vitals for the past 4 hrs:   Temp Pulse Resp BP SpO2   03/16/23 0200 98.9 °F (37.2 °C) 100 -- (!) 142/101 94 %   03/15/23 2347 -- (!) 101 27 -- --          Physical Exam  Vitals and nursing note reviewed. Constitutional:       General: She is not in acute distress. Appearance: Normal appearance. She is obese. She is not toxic-appearing or diaphoretic. Comments: Lying on stretcher no acute distress. Appears uncomfortable. Nontoxic-appearing. Not acutely ill-appearing. Noted to be morbidly obese. HENT:      Head: Normocephalic and atraumatic. Right Ear: External ear normal.      Left Ear: External ear normal.      Nose: Nose normal. No congestion or rhinorrhea. Mouth/Throat:      Mouth: Mucous membranes are moist.      Pharynx: No oropharyngeal exudate or posterior oropharyngeal erythema.    Eyes: General: No scleral icterus. Right eye: No discharge. Left eye: No discharge. Extraocular Movements: Extraocular movements intact. Pupils: Pupils are equal, round, and reactive to light. Cardiovascular:      Rate and Rhythm: Normal rate. Pulses: Normal pulses. Heart sounds: Normal heart sounds. No murmur heard. No friction rub. No gallop. Pulmonary:      Effort: Pulmonary effort is normal. No respiratory distress. Breath sounds: Normal breath sounds. No stridor. No wheezing, rhonchi or rales. Abdominal:      General: Abdomen is flat. Bowel sounds are normal. There is no distension. Palpations: Abdomen is soft. There is no mass. Tenderness: There is generalized abdominal tenderness and tenderness in the right lower quadrant. There is no right CVA tenderness, left CVA tenderness, guarding or rebound. Negative signs include Mittal's sign, Rovsing's sign, McBurney's sign, psoas sign and obturator sign. Musculoskeletal:         General: No swelling, tenderness, deformity or signs of injury. Normal range of motion. Cervical back: Normal range of motion and neck supple. No rigidity or tenderness. Right lower leg: No edema. Left lower leg: No edema. Lymphadenopathy:      Cervical: No cervical adenopathy. Skin:     General: Skin is warm. Capillary Refill: Capillary refill takes less than 2 seconds. Coloration: Skin is not cyanotic, jaundiced or pale. Findings: No bruising, erythema or rash. Neurological:      General: No focal deficit present. Mental Status: She is alert and oriented to person, place, and time. Motor: No weakness. Coordination: Coordination normal.      Gait: Gait normal.   Psychiatric:         Mood and Affect: Mood is anxious and depressed. Behavior: Behavior normal.         Thought Content:  Thought content normal.         Judgment: Judgment normal.        MDM  Number of Diagnoses or Management Options  Dysmenorrhea  Right arm pain  Diagnosis management comments: Appears to have in the and only pain from menstrual cramps and pain from right shoulder injury. In addition likely has exacerbating symptoms related to fibromyalgia as well as hypersensitivity to pain status post using meth today. Improved with treatment. No other significant abnormal findings noted. Will discharge home with recommended follow-up with PMD.  Clear return precautions discussed. Rx Dolobid. Laboratory studies remarkable only for white blood cell count of 12.9, normal hemoglobin, glucose 122, urine drug screen positive for marijuana, meth, opiates. CT scan is unremarkable with no acute pathology or abnormalities noted. No evidence of appendicitis noted.        Amount and/or Complexity of Data Reviewed  Clinical lab tests: ordered and reviewed  Tests in the radiology section of CPT®: ordered and reviewed  Tests in the medicine section of CPT®: ordered and reviewed  Decide to obtain previous medical records or to obtain history from someone other than the patient: yes  Review and summarize past medical records: yes  Independent visualization of images, tracings, or specimens: yes        Procedures    Labs Reviewed   URINALYSIS - Abnormal; Notable for the following components:       Result Value    Protein,  (*)     Bilirubin Urine SMALL (*)     Blood, Urine LARGE (*)     Leukocyte Esterase, Urine TRACE (*)     All other components within normal limits   CBC WITH AUTO DIFFERENTIAL - Abnormal; Notable for the following components:    WBC 12.9 (*)     MCV 81.8 (*)     MPV 9.2 (*)     Seg Neutrophils 81 (*)     Monocytes 3 (*)     Segs Absolute 10.5 (*)     All other components within normal limits   COMPREHENSIVE METABOLIC PANEL - Abnormal; Notable for the following components:    Anion Gap 12 (*)     Glucose 222 (*)     Alk Phosphatase 141 (*)     Total Protein 8.4 (*)     All other components within normal limits   URINE DRUG SCREEN - Abnormal; Notable for the following components:    Amphetamine, Urine Positive (*)     THC, TH-Cannabinol, Urine Positive (*)     Opiates, Urine Positive (*)     All other components within normal limits   PREGNANCY, URINE   TROPONIN T   LIPASE   URINALYSIS, MICRO        CT ABDOMEN PELVIS W IV CONTRAST Additional Contrast? None   Final Result   1. No acute abnormality. 2. Mild hepatomegaly with mild hepatic steatosis. 3. There is uterine heterogeneity likely representing underlying uterine    fibroids. Slot 63       Kevon Mcdowell M.D.    3/16/2023 1:09:00 AM               [place] Coma Scale  Eye Opening: Spontaneous  Best Verbal Response: Oriented  Best Motor Response: Obeys commands  [place] Coma Scale Score: 15               ED Course as of 03/16/23 0207   Wed Mar 15, 2023   2233 EKG interpretation: Sinus rhythm, rate of 76, normal axis, no ischemia. [BR]      ED Course User Index  [BR] Solange Gordon DO        Voice dictation software was used during the making of this note. This software is not perfect and grammatical and other typographical errors may be present. This note has not been completely proofread for errors.        Flor Zapata MD  03/16/23 9427

## 2023-03-22 ENCOUNTER — TELEPHONE (OUTPATIENT)
Dept: ORTHOPEDIC SURGERY | Age: 48
End: 2023-03-22

## 2023-04-24 ENCOUNTER — OFFICE VISIT (OUTPATIENT)
Dept: PRIMARY CARE CLINIC | Facility: CLINIC | Age: 48
End: 2023-04-24
Payer: COMMERCIAL

## 2023-04-24 VITALS
WEIGHT: 275 LBS | TEMPERATURE: 98.1 F | BODY MASS INDEX: 45.82 KG/M2 | OXYGEN SATURATION: 98 % | SYSTOLIC BLOOD PRESSURE: 130 MMHG | HEIGHT: 65 IN | DIASTOLIC BLOOD PRESSURE: 80 MMHG | HEART RATE: 98 BPM

## 2023-04-24 DIAGNOSIS — Z12.31 SCREENING MAMMOGRAM FOR BREAST CANCER: ICD-10-CM

## 2023-04-24 DIAGNOSIS — J45.30 MILD PERSISTENT ASTHMA, UNCOMPLICATED: ICD-10-CM

## 2023-04-24 DIAGNOSIS — Z53.20 COLONOSCOPY REFUSED: ICD-10-CM

## 2023-04-24 DIAGNOSIS — E13.649 UNCONTROLLED DIABETES MELLITUS OF OTHER TYPE WITH HYPOGLYCEMIA, UNSPECIFIED HYPOGLYCEMIA COMA STATUS (HCC): ICD-10-CM

## 2023-04-24 DIAGNOSIS — G89.4 CHRONIC PAIN SYNDROME: ICD-10-CM

## 2023-04-24 DIAGNOSIS — Z00.00 PHYSICAL EXAM: Primary | ICD-10-CM

## 2023-04-24 DIAGNOSIS — I10 PRIMARY HYPERTENSION: ICD-10-CM

## 2023-04-24 DIAGNOSIS — Z87.81 HX OF FRACTURE OF HUMERUS: ICD-10-CM

## 2023-04-24 DIAGNOSIS — E78.5 HYPERLIPIDEMIA, UNSPECIFIED HYPERLIPIDEMIA TYPE: ICD-10-CM

## 2023-04-24 DIAGNOSIS — M25.511 ACUTE PAIN OF RIGHT SHOULDER: ICD-10-CM

## 2023-04-24 PROCEDURE — 3075F SYST BP GE 130 - 139MM HG: CPT | Performed by: FAMILY MEDICINE

## 2023-04-24 PROCEDURE — 3079F DIAST BP 80-89 MM HG: CPT | Performed by: FAMILY MEDICINE

## 2023-04-24 PROCEDURE — 99396 PREV VISIT EST AGE 40-64: CPT | Performed by: FAMILY MEDICINE

## 2023-04-24 RX ORDER — METFORMIN HYDROCHLORIDE 500 MG/1
1000 TABLET, EXTENDED RELEASE ORAL DAILY
Qty: 180 TABLET | Refills: 0 | Status: SHIPPED | OUTPATIENT
Start: 2023-04-24

## 2023-04-24 RX ORDER — LISINOPRIL 2.5 MG/1
2.5 TABLET ORAL DAILY
Qty: 90 TABLET | Refills: 0 | Status: SHIPPED | OUTPATIENT
Start: 2023-04-24

## 2023-04-24 RX ORDER — ATORVASTATIN CALCIUM 10 MG/1
10 TABLET, FILM COATED ORAL DAILY
Qty: 90 TABLET | Refills: 0 | Status: SHIPPED | OUTPATIENT
Start: 2023-04-24

## 2023-04-24 RX ORDER — ALBUTEROL SULFATE 90 UG/1
2 AEROSOL, METERED RESPIRATORY (INHALATION) EVERY 4 HOURS PRN
Qty: 18 G | Refills: 2 | Status: SHIPPED | OUTPATIENT
Start: 2023-04-24

## 2023-04-24 ASSESSMENT — PATIENT HEALTH QUESTIONNAIRE - PHQ9
3. TROUBLE FALLING OR STAYING ASLEEP: 1
SUM OF ALL RESPONSES TO PHQ QUESTIONS 1-9: 5
8. MOVING OR SPEAKING SO SLOWLY THAT OTHER PEOPLE COULD HAVE NOTICED. OR THE OPPOSITE, BEING SO FIGETY OR RESTLESS THAT YOU HAVE BEEN MOVING AROUND A LOT MORE THAN USUAL: 1
7. TROUBLE CONCENTRATING ON THINGS, SUCH AS READING THE NEWSPAPER OR WATCHING TELEVISION: 0
5. POOR APPETITE OR OVEREATING: 0
1. LITTLE INTEREST OR PLEASURE IN DOING THINGS: 1
SUM OF ALL RESPONSES TO PHQ QUESTIONS 1-9: 5
9. THOUGHTS THAT YOU WOULD BE BETTER OFF DEAD, OR OF HURTING YOURSELF: 0
4. FEELING TIRED OR HAVING LITTLE ENERGY: 1
6. FEELING BAD ABOUT YOURSELF - OR THAT YOU ARE A FAILURE OR HAVE LET YOURSELF OR YOUR FAMILY DOWN: 0
SUM OF ALL RESPONSES TO PHQ QUESTIONS 1-9: 5
10. IF YOU CHECKED OFF ANY PROBLEMS, HOW DIFFICULT HAVE THESE PROBLEMS MADE IT FOR YOU TO DO YOUR WORK, TAKE CARE OF THINGS AT HOME, OR GET ALONG WITH OTHER PEOPLE: 1
2. FEELING DOWN, DEPRESSED OR HOPELESS: 1
SUM OF ALL RESPONSES TO PHQ QUESTIONS 1-9: 5
SUM OF ALL RESPONSES TO PHQ9 QUESTIONS 1 & 2: 2

## 2023-04-24 ASSESSMENT — VISUAL ACUITY
OS_CC: 20/50
OD_CC: 20/70

## 2023-04-24 NOTE — PROGRESS NOTES
[address] [facility], [address]  Office : [phone]  Fax : [phone]          Subjective: The patient is a 55 y.o. female  who presents for f/u on PHYSICAL  Eye exam 9/2021 as per pt  Pt refused colonoscopy/pap/sleep study in 1/2022  MAMMOGRAM 2019    multiple chronic medical conditions-good compliance with medications-pt here to get routeine labs and need refill on meds. no c  UNCONTROLLED diabetes -pts blood sugar  NOT stable on med sec to noncompliance-pt was out of med when A1C was high-now taking med daily--need recheck  pts diet/exercise poor  Hypertension-stable on med  Hyperlipidemia--pts on low carb diet and low fat diet -stable on med  Gerd -stable on diet /med  Chronic lowback pain-stable now--on and off exacerbation-pt was recently seen in ER for hthe same and right thigh pain-x-ray were normal  Pt sees pain clinic for pain meds-on suboxone-  Psychiatric d/o-sees specialist-- anxiety  -on and off-pt to f/u with special;ist soon  RECENT HX OF RIGHT HUMERUS HEAD FRACTURE-USES SLING--SEEING ORTHO-STILL IN PAIN FEW WEEKS AFTER-WANTS to see another ortho-requesting referral      Patient Active Problem List   Diagnosis Code    Schizoaffective disorder, depressive type ([address] F25.1    Psychiatric disorder F99    Fibromyalgia M79.7    Chronic pain syndrome G89.4    Screening for metabolic disorder Z87.806    Screening for STD (sexually transmitted disease) Z11.3    Dietary counseling Z71.3    Obesity, unspecified obesity severity, unspecified obesity type E66.9    Anxiety F41.9    Obesity, morbid ([address] E66.01    Physical exam Z00.00    Hepatitis C virus infection without hepatic coma B19.20    Hyperlipidemia E78.5    Hypertriglyceridemia E78.1    Elevated BP without diagnosis of hypertension R03.0    History of hepatitis C virus infection Z86.19    Right thigh pain M79.651    Chronic right-sided low back pain with right-sided sciatica M54.41, G89.29    Low

## 2023-07-07 ENCOUNTER — COMMUNITY OUTREACH (OUTPATIENT)
Dept: PRIMARY CARE CLINIC | Facility: CLINIC | Age: 48
End: 2023-07-07

## 2023-07-25 ENCOUNTER — OFFICE VISIT (OUTPATIENT)
Dept: PRIMARY CARE CLINIC | Facility: CLINIC | Age: 48
End: 2023-07-25
Payer: COMMERCIAL

## 2023-07-25 VITALS
OXYGEN SATURATION: 99 % | HEIGHT: 65 IN | SYSTOLIC BLOOD PRESSURE: 130 MMHG | WEIGHT: 268 LBS | DIASTOLIC BLOOD PRESSURE: 70 MMHG | TEMPERATURE: 97.5 F | HEART RATE: 108 BPM | BODY MASS INDEX: 44.65 KG/M2

## 2023-07-25 DIAGNOSIS — Z53.20 MAMMOGRAM DECLINED: ICD-10-CM

## 2023-07-25 DIAGNOSIS — J45.30 MILD PERSISTENT ASTHMA, UNCOMPLICATED: ICD-10-CM

## 2023-07-25 DIAGNOSIS — Z53.20 COLONOSCOPY REFUSED: ICD-10-CM

## 2023-07-25 DIAGNOSIS — E66.01 MORBID (SEVERE) OBESITY DUE TO EXCESS CALORIES (HCC): ICD-10-CM

## 2023-07-25 DIAGNOSIS — M51.36 DDD (DEGENERATIVE DISC DISEASE), LUMBAR: ICD-10-CM

## 2023-07-25 DIAGNOSIS — E13.649 UNCONTROLLED DIABETES MELLITUS OF OTHER TYPE WITH HYPOGLYCEMIA, UNSPECIFIED HYPOGLYCEMIA COMA STATUS (HCC): Primary | ICD-10-CM

## 2023-07-25 DIAGNOSIS — E78.5 HYPERLIPIDEMIA, UNSPECIFIED HYPERLIPIDEMIA TYPE: ICD-10-CM

## 2023-07-25 DIAGNOSIS — Z53.20 PAP SMEAR OF CERVIX DECLINED: ICD-10-CM

## 2023-07-25 DIAGNOSIS — I10 PRIMARY HYPERTENSION: ICD-10-CM

## 2023-07-25 DIAGNOSIS — G89.4 CHRONIC PAIN SYNDROME: ICD-10-CM

## 2023-07-25 DIAGNOSIS — M54.41 CHRONIC BILATERAL LOW BACK PAIN WITH RIGHT-SIDED SCIATICA: ICD-10-CM

## 2023-07-25 DIAGNOSIS — G89.29 CHRONIC BILATERAL LOW BACK PAIN WITH RIGHT-SIDED SCIATICA: ICD-10-CM

## 2023-07-25 DIAGNOSIS — Z71.3 DIETARY COUNSELING AND SURVEILLANCE: ICD-10-CM

## 2023-07-25 PROCEDURE — 3075F SYST BP GE 130 - 139MM HG: CPT | Performed by: FAMILY MEDICINE

## 2023-07-25 PROCEDURE — 99214 OFFICE O/P EST MOD 30 MIN: CPT | Performed by: FAMILY MEDICINE

## 2023-07-25 PROCEDURE — 3078F DIAST BP <80 MM HG: CPT | Performed by: FAMILY MEDICINE

## 2023-07-25 PROCEDURE — 3044F HG A1C LEVEL LT 7.0%: CPT | Performed by: FAMILY MEDICINE

## 2023-07-25 RX ORDER — ALBUTEROL SULFATE 90 UG/1
2 AEROSOL, METERED RESPIRATORY (INHALATION) EVERY 4 HOURS PRN
Qty: 18 G | Refills: 2 | Status: SHIPPED | OUTPATIENT
Start: 2023-07-25

## 2023-07-25 RX ORDER — LISINOPRIL 2.5 MG/1
2.5 TABLET ORAL DAILY
Qty: 90 TABLET | Refills: 0 | Status: SHIPPED | OUTPATIENT
Start: 2023-07-25

## 2023-07-25 RX ORDER — ATORVASTATIN CALCIUM 10 MG/1
10 TABLET, FILM COATED ORAL DAILY
Qty: 90 TABLET | Refills: 0 | Status: SHIPPED | OUTPATIENT
Start: 2023-07-25

## 2023-07-25 RX ORDER — METFORMIN HYDROCHLORIDE 500 MG/1
1000 TABLET, EXTENDED RELEASE ORAL DAILY
Qty: 180 TABLET | Refills: 0 | Status: SHIPPED | OUTPATIENT
Start: 2023-07-25

## 2023-07-25 RX ORDER — LIDOCAINE 50 MG/G
1 PATCH TOPICAL DAILY PRN
Qty: 30 PATCH | Refills: 2 | Status: SHIPPED | OUTPATIENT
Start: 2023-07-25 | End: 2023-10-23

## 2023-07-25 NOTE — PROGRESS NOTES
file     Social Determinants of Health     Financial Resource Strain: Low Risk     Difficulty of Paying Living Expenses: Not hard at all   Food Insecurity: No Food Insecurity    Worried About [place] in the Last Year: Never true    [address] in the Last Year: Never true   Transportation Needs:     Lack of Transportation (Medical): Not on file    Lack of Transportation (Non-Medical): Not on file   Physical Activity:     Days of Exercise per Week: Not on file    Minutes of Exercise per Session: Not on file   Stress:     Feeling of Stress : Not on file   Social Connections:     Frequency of Communication with Friends and Family: Not on file    Frequency of Social Gatherings with Friends and Family: Not on file    Attends Anabaptism Services: Not on file    Active Member of Clubs or Organizations: Not on file    Attends Club or Organization Meetings: Not on file    Marital Status: Not on file   Intimate Partner Violence:     Fear of Current or Ex-Partner: Not on file    Emotionally Abused: Not on file    Physically Abused: Not on file    Sexually Abused: Not on file   Housing Stability:     Unable to Pay for Housing in the Last Year: Not on file    Number of [address] in the Last Year: Not on file    Unstable Housing in the Last Year: Not on file       Allergies   Allergen Reactions    Haldol [Haloperidol Lactate] Rash    Pcn [Penicillins] Nausea and Vomiting    Toradol [Ketorolac Tromethamine] Other (comments)     \"it made me feel really worse\"    Vioxx [Rofecoxib] Palpitations    Vistaril [Hydroxyzine Pamoate] Other (comments)     \"just makes me feel worse\"       Current Outpatient Medications   Medication Sig Dispense Refill    atorvastatin (LIPITOR) 10 mg tablet Take 1 Tablet by mouth daily. PM 90 Tablet 0    lisinopriL (PRINIVIL, ZESTRIL) 2.5 mg tablet Take 1 Tablet by mouth daily. 90 Tablet 0    metFORMIN ER (GLUCOPHAGE XR) 500 mg tablet Take 1 Tablet by mouth daily.  90 Tablet 0

## 2023-10-25 ENCOUNTER — OFFICE VISIT (OUTPATIENT)
Dept: PRIMARY CARE CLINIC | Facility: CLINIC | Age: 48
End: 2023-10-25
Payer: COMMERCIAL

## 2023-10-25 VITALS
SYSTOLIC BLOOD PRESSURE: 136 MMHG | OXYGEN SATURATION: 94 % | WEIGHT: 260 LBS | BODY MASS INDEX: 43.32 KG/M2 | DIASTOLIC BLOOD PRESSURE: 74 MMHG | TEMPERATURE: 97 F | HEIGHT: 65 IN | HEART RATE: 116 BPM

## 2023-10-25 DIAGNOSIS — M54.41 CHRONIC BILATERAL LOW BACK PAIN WITH RIGHT-SIDED SCIATICA: ICD-10-CM

## 2023-10-25 DIAGNOSIS — J45.30 MILD PERSISTENT ASTHMA, UNCOMPLICATED: ICD-10-CM

## 2023-10-25 DIAGNOSIS — M51.36 DDD (DEGENERATIVE DISC DISEASE), LUMBAR: ICD-10-CM

## 2023-10-25 DIAGNOSIS — G89.4 CHRONIC PAIN SYNDROME: ICD-10-CM

## 2023-10-25 DIAGNOSIS — E78.5 HYPERLIPIDEMIA, UNSPECIFIED HYPERLIPIDEMIA TYPE: ICD-10-CM

## 2023-10-25 DIAGNOSIS — G89.29 CHRONIC BILATERAL LOW BACK PAIN WITH RIGHT-SIDED SCIATICA: ICD-10-CM

## 2023-10-25 DIAGNOSIS — M25.511 ACUTE PAIN OF RIGHT SHOULDER: ICD-10-CM

## 2023-10-25 DIAGNOSIS — Z53.20 COLONOSCOPY REFUSED: ICD-10-CM

## 2023-10-25 DIAGNOSIS — Z71.3 DIETARY COUNSELING AND SURVEILLANCE: ICD-10-CM

## 2023-10-25 DIAGNOSIS — I10 PRIMARY HYPERTENSION: ICD-10-CM

## 2023-10-25 DIAGNOSIS — E66.01 MORBID (SEVERE) OBESITY DUE TO EXCESS CALORIES (HCC): ICD-10-CM

## 2023-10-25 DIAGNOSIS — E13.649 UNCONTROLLED DIABETES MELLITUS OF OTHER TYPE WITH HYPOGLYCEMIA, UNSPECIFIED HYPOGLYCEMIA COMA STATUS (HCC): Primary | ICD-10-CM

## 2023-10-25 DIAGNOSIS — Z53.20 MAMMOGRAM DECLINED: ICD-10-CM

## 2023-10-25 PROCEDURE — 99214 OFFICE O/P EST MOD 30 MIN: CPT | Performed by: FAMILY MEDICINE

## 2023-10-25 PROCEDURE — 3078F DIAST BP <80 MM HG: CPT | Performed by: FAMILY MEDICINE

## 2023-10-25 PROCEDURE — 3044F HG A1C LEVEL LT 7.0%: CPT | Performed by: FAMILY MEDICINE

## 2023-10-25 PROCEDURE — 3075F SYST BP GE 130 - 139MM HG: CPT | Performed by: FAMILY MEDICINE

## 2023-10-25 RX ORDER — ATORVASTATIN CALCIUM 10 MG/1
10 TABLET, FILM COATED ORAL DAILY
Qty: 90 TABLET | Refills: 0 | Status: SHIPPED | OUTPATIENT
Start: 2023-10-25

## 2023-10-25 RX ORDER — ALBUTEROL SULFATE 90 UG/1
2 AEROSOL, METERED RESPIRATORY (INHALATION) EVERY 4 HOURS PRN
Qty: 18 G | Refills: 2 | Status: SHIPPED | OUTPATIENT
Start: 2023-10-25

## 2023-10-25 RX ORDER — METFORMIN HYDROCHLORIDE 500 MG/1
1000 TABLET, EXTENDED RELEASE ORAL DAILY
Qty: 180 TABLET | Refills: 0 | Status: SHIPPED | OUTPATIENT
Start: 2023-10-25

## 2023-10-25 RX ORDER — LISINOPRIL 2.5 MG/1
2.5 TABLET ORAL DAILY
Qty: 90 TABLET | Refills: 0 | Status: SHIPPED | OUTPATIENT
Start: 2023-10-25

## 2023-10-25 SDOH — ECONOMIC STABILITY: FOOD INSECURITY: WITHIN THE PAST 12 MONTHS, THE FOOD YOU BOUGHT JUST DIDN'T LAST AND YOU DIDN'T HAVE MONEY TO GET MORE.: NEVER TRUE

## 2023-10-25 SDOH — ECONOMIC STABILITY: FOOD INSECURITY: WITHIN THE PAST 12 MONTHS, YOU WORRIED THAT YOUR FOOD WOULD RUN OUT BEFORE YOU GOT MONEY TO BUY MORE.: NEVER TRUE

## 2023-10-25 SDOH — ECONOMIC STABILITY: INCOME INSECURITY: HOW HARD IS IT FOR YOU TO PAY FOR THE VERY BASICS LIKE FOOD, HOUSING, MEDICAL CARE, AND HEATING?: NOT HARD AT ALL

## 2023-10-25 ASSESSMENT — SOCIAL DETERMINANTS OF HEALTH (SDOH): HOW HARD IS IT FOR YOU TO PAY FOR THE VERY BASICS LIKE FOOD, HOUSING, MEDICAL CARE, AND HEATING?: HARD

## 2023-10-25 NOTE — PROGRESS NOTES
in the slightly lower outer quadrant at middle depth, 3:00-4:00. Recommend  ultrasound to further evaluate. There are scattered benign calcifications with  no suspicious grouping. Ultrasound: Real time targeted sonography was performed of the left breast  3:00-4:00 axes, by the radiologist and sonographer. Corresponding to the  mammogram at 3:30 position 8 cm from the nipple is a benign 0.7 cm simple cyst.  There is no suspicious solid mass, shadowing, hypervascularity, or  lymphadenopathy. Otherwise the 3:00-4:00 axes demonstrate a few other tiny  benign cysts but no concerning abnormality. Impression  IMPRESSION: Left lateral small benign cysts. No evidence of malignancy. Recommend screening mammography in one year. A reminder letter will be  scheduled. This was discussed in full with the patient at the time of  interpretation. BI-RADS Assessment Category 2: Benign finding      We discussed the expected course, resolution and complications of the diagnosis(es) in detail. Medication risks, benefits, costs, interactions, and alternatives were discussed as indicated. I advised her to contact the office if her condition worsens, changes or fails to improve as anticipated. She expressed understanding with the diagnosis(es) and plan. Follow-up and Dispositions    Return in about 3 months           Yvonne Lance MD

## 2023-11-10 ENCOUNTER — HOSPITAL ENCOUNTER (OUTPATIENT)
Dept: GENERAL RADIOLOGY | Age: 48
End: 2023-11-10
Payer: COMMERCIAL

## 2023-11-10 DIAGNOSIS — M25.511 ACUTE PAIN OF RIGHT SHOULDER: ICD-10-CM

## 2023-11-10 DIAGNOSIS — J45.30 MILD PERSISTENT ASTHMA, UNCOMPLICATED: ICD-10-CM

## 2023-11-10 DIAGNOSIS — E78.5 HYPERLIPIDEMIA, UNSPECIFIED HYPERLIPIDEMIA TYPE: ICD-10-CM

## 2023-11-10 DIAGNOSIS — E13.649 UNCONTROLLED DIABETES MELLITUS OF OTHER TYPE WITH HYPOGLYCEMIA, UNSPECIFIED HYPOGLYCEMIA COMA STATUS (HCC): ICD-10-CM

## 2023-11-10 DIAGNOSIS — I10 PRIMARY HYPERTENSION: ICD-10-CM

## 2023-11-10 LAB
BASOPHILS # BLD: 0.1 K/UL (ref 0–0.2)
BASOPHILS NFR BLD: 1 % (ref 0–2)
DIFFERENTIAL METHOD BLD: ABNORMAL
EOSINOPHIL # BLD: 0.7 K/UL (ref 0–0.8)
EOSINOPHIL NFR BLD: 7 % (ref 0.5–7.8)
ERYTHROCYTE [DISTWIDTH] IN BLOOD BY AUTOMATED COUNT: 13.2 % (ref 11.9–14.6)
HCT VFR BLD AUTO: 43.8 % (ref 35.8–46.3)
HGB BLD-MCNC: 13.9 G/DL (ref 11.7–15.4)
IMM GRANULOCYTES # BLD AUTO: 0 K/UL (ref 0–0.5)
IMM GRANULOCYTES NFR BLD AUTO: 0 % (ref 0–5)
LYMPHOCYTES # BLD: 2.9 K/UL (ref 0.5–4.6)
LYMPHOCYTES NFR BLD: 31 % (ref 13–44)
MCH RBC QN AUTO: 26.6 PG (ref 26.1–32.9)
MCHC RBC AUTO-ENTMCNC: 31.7 G/DL (ref 31.4–35)
MCV RBC AUTO: 83.7 FL (ref 82–102)
MONOCYTES # BLD: 0.5 K/UL (ref 0.1–1.3)
MONOCYTES NFR BLD: 5 % (ref 4–12)
NEUTS SEG # BLD: 5.5 K/UL (ref 1.7–8.2)
NEUTS SEG NFR BLD: 56 % (ref 43–78)
NRBC # BLD: 0 K/UL (ref 0–0.2)
PLATELET # BLD AUTO: 293 K/UL (ref 150–450)
PMV BLD AUTO: 11.1 FL (ref 9.4–12.3)
RBC # BLD AUTO: 5.23 M/UL (ref 4.05–5.2)
WBC # BLD AUTO: 9.6 K/UL (ref 4.3–11.1)

## 2023-11-10 PROCEDURE — 73030 X-RAY EXAM OF SHOULDER: CPT

## 2023-11-11 LAB
25(OH)D3 SERPL-MCNC: 14.6 NG/ML (ref 30–100)
ALBUMIN SERPL-MCNC: 3.5 G/DL (ref 3.5–5)
ALBUMIN/GLOB SERPL: 0.9 (ref 0.4–1.6)
ALP SERPL-CCNC: 138 U/L (ref 50–136)
ALT SERPL-CCNC: 34 U/L (ref 12–65)
ANION GAP SERPL CALC-SCNC: 12 MMOL/L (ref 2–11)
AST SERPL-CCNC: 28 U/L (ref 15–37)
BILIRUB SERPL-MCNC: 0.4 MG/DL (ref 0.2–1.1)
BUN SERPL-MCNC: 8 MG/DL (ref 6–23)
CALCIUM SERPL-MCNC: 9 MG/DL (ref 8.3–10.4)
CHLORIDE SERPL-SCNC: 99 MMOL/L (ref 101–110)
CHOLEST SERPL-MCNC: 234 MG/DL
CO2 SERPL-SCNC: 23 MMOL/L (ref 21–32)
CREAT SERPL-MCNC: 1.2 MG/DL (ref 0.6–1)
GLOBULIN SER CALC-MCNC: 3.8 G/DL (ref 2.8–4.5)
GLUCOSE SERPL-MCNC: 560 MG/DL (ref 65–100)
HDLC SERPL-MCNC: 47 MG/DL (ref 40–60)
HDLC SERPL: 5
LDLC SERPL CALC-MCNC: 133.6 MG/DL
POTASSIUM SERPL-SCNC: 4.2 MMOL/L (ref 3.5–5.1)
PROT SERPL-MCNC: 7.3 G/DL (ref 6.3–8.2)
SODIUM SERPL-SCNC: 134 MMOL/L (ref 133–143)
TRIGL SERPL-MCNC: 267 MG/DL (ref 35–150)
TSH W FREE THYROID IF ABNORMAL: 1.14 UIU/ML (ref 0.36–3.74)
VLDLC SERPL CALC-MCNC: 53.4 MG/DL (ref 6–23)

## 2023-11-12 LAB
EST. AVERAGE GLUCOSE BLD GHB EST-MCNC: 306 MG/DL
HBA1C MFR BLD: 12.3 % (ref 4.8–5.6)

## 2023-11-13 ENCOUNTER — HOSPITAL ENCOUNTER (EMERGENCY)
Age: 48
Discharge: HOME | End: 2023-11-13
Attending: STUDENT IN AN ORGANIZED HEALTH CARE EDUCATION/TRAINING PROGRAM
Payer: COMMERCIAL

## 2023-11-13 ENCOUNTER — OFFICE VISIT (OUTPATIENT)
Dept: PRIMARY CARE CLINIC | Facility: CLINIC | Age: 48
End: 2023-11-13
Payer: COMMERCIAL

## 2023-11-13 VITALS
HEART RATE: 98 BPM | BODY MASS INDEX: 42.99 KG/M2 | RESPIRATION RATE: 18 BRPM | HEIGHT: 65 IN | WEIGHT: 258 LBS | OXYGEN SATURATION: 98 % | SYSTOLIC BLOOD PRESSURE: 146 MMHG | DIASTOLIC BLOOD PRESSURE: 92 MMHG | TEMPERATURE: 98 F

## 2023-11-13 VITALS
HEART RATE: 106 BPM | BODY MASS INDEX: 42.99 KG/M2 | DIASTOLIC BLOOD PRESSURE: 60 MMHG | OXYGEN SATURATION: 97 % | TEMPERATURE: 97 F | HEIGHT: 65 IN | WEIGHT: 258 LBS | SYSTOLIC BLOOD PRESSURE: 140 MMHG

## 2023-11-13 DIAGNOSIS — Z91.148 NONCOMPLIANCE WITH DIET AND MEDICATION REGIMEN: ICD-10-CM

## 2023-11-13 DIAGNOSIS — R73.9 HYPERGLYCEMIA: Primary | ICD-10-CM

## 2023-11-13 DIAGNOSIS — E86.0 DEHYDRATION: ICD-10-CM

## 2023-11-13 DIAGNOSIS — Z91.199 NONCOMPLIANCE WITH DIET AND MEDICATION REGIMEN: ICD-10-CM

## 2023-11-13 DIAGNOSIS — E11.65 HYPERGLYCEMIA DUE TO DIABETES MELLITUS (HCC): Primary | ICD-10-CM

## 2023-11-13 DIAGNOSIS — R82.4 KETONURIA: ICD-10-CM

## 2023-11-13 LAB
ALBUMIN SERPL-MCNC: 4 G/DL (ref 3.5–5)
ALBUMIN/GLOB SERPL: 1 (ref 0.4–1.6)
ALP SERPL-CCNC: 135 U/L (ref 45–117)
ALT SERPL-CCNC: 30 U/L (ref 13–61)
ANION GAP SERPL CALC-SCNC: 13 MMOL/L (ref 2–11)
AST SERPL-CCNC: 32 U/L (ref 15–37)
BASOPHILS # BLD: 0.1 K/UL (ref 0–0.2)
BASOPHILS NFR BLD: 1 % (ref 0–2)
BILIRUB SERPL-MCNC: 0.2 MG/DL (ref 0.2–1.1)
BILIRUBIN, URINE, POC: NEGATIVE
BLOOD URINE, POC: NEGATIVE
BUN SERPL-MCNC: 8 MG/DL (ref 6–23)
CALCIUM SERPL-MCNC: 9.4 MG/DL (ref 8.3–10.4)
CHLORIDE SERPL-SCNC: 96 MMOL/L (ref 98–107)
CO2 SERPL-SCNC: 23 MMOL/L (ref 21–32)
CREAT SERPL-MCNC: 0.66 MG/DL (ref 0.6–1)
DIFFERENTIAL METHOD BLD: NORMAL
EOSINOPHIL # BLD: 0.5 K/UL (ref 0–0.8)
EOSINOPHIL NFR BLD: 7 % (ref 0.5–7.8)
ERYTHROCYTE [DISTWIDTH] IN BLOOD BY AUTOMATED COUNT: 13.1 % (ref 11.9–14.6)
GLOBULIN SER CALC-MCNC: 3.9 G/DL (ref 2.8–4.5)
GLUCOSE BLD STRIP.AUTO-MCNC: 291 MG/DL (ref 65–100)
GLUCOSE SERPL-MCNC: 530 MG/DL (ref 65–100)
GLUCOSE URINE, POC: 1000
GLUCOSE, POC: 560 MG/DL
HCT VFR BLD AUTO: 43.6 % (ref 35.8–46.3)
HGB BLD-MCNC: 13.8 G/DL (ref 11.7–15.4)
IMM GRANULOCYTES # BLD AUTO: 0 K/UL (ref 0–0.5)
IMM GRANULOCYTES NFR BLD AUTO: 0 % (ref 0–5)
KETONES, URINE, POC: 15
LEUKOCYTE ESTERASE, URINE, POC: NEGATIVE
LYMPHOCYTES # BLD: 2.1 K/UL (ref 0.5–4.6)
LYMPHOCYTES NFR BLD: 29 % (ref 13–44)
MCH RBC QN AUTO: 26.5 PG (ref 26.1–32.9)
MCHC RBC AUTO-ENTMCNC: 31.7 G/DL (ref 31.4–35)
MCV RBC AUTO: 83.8 FL (ref 82–102)
MONOCYTES # BLD: 0.3 K/UL (ref 0.1–1.3)
MONOCYTES NFR BLD: 4 % (ref 4–12)
NEUTS SEG # BLD: 4.4 K/UL (ref 1.7–8.2)
NEUTS SEG NFR BLD: 59 % (ref 43–78)
NITRITE, URINE, POC: NEGATIVE
NRBC # BLD: 0 K/UL (ref 0–0.2)
PH, URINE, POC: 5.5 (ref 4.6–8)
PLATELET # BLD AUTO: 299 K/UL (ref 150–450)
PMV BLD AUTO: 9.5 FL (ref 9.4–12.3)
POTASSIUM SERPL-SCNC: 4.2 MMOL/L (ref 3.5–5.1)
PROT SERPL-MCNC: 7.9 G/DL (ref 6.4–8.2)
PROTEIN,URINE, POC: NEGATIVE
RBC # BLD AUTO: 5.2 M/UL (ref 4.05–5.2)
SERVICE CMNT-IMP: ABNORMAL
SODIUM SERPL-SCNC: 132 MMOL/L (ref 133–143)
SPECIFIC GRAVITY, URINE, POC: 1 (ref 1–1.03)
URINALYSIS CLARITY, POC: CLEAR
URINALYSIS COLOR, POC: YELLOW
UROBILINOGEN, POC: ABNORMAL
WBC # BLD AUTO: 7.4 K/UL (ref 4.3–11.1)

## 2023-11-13 PROCEDURE — 82962 GLUCOSE BLOOD TEST: CPT | Performed by: FAMILY MEDICINE

## 2023-11-13 PROCEDURE — 96360 HYDRATION IV INFUSION INIT: CPT

## 2023-11-13 PROCEDURE — 2580000003 HC RX 258: Performed by: STUDENT IN AN ORGANIZED HEALTH CARE EDUCATION/TRAINING PROGRAM

## 2023-11-13 PROCEDURE — 3077F SYST BP >= 140 MM HG: CPT | Performed by: FAMILY MEDICINE

## 2023-11-13 PROCEDURE — 3078F DIAST BP <80 MM HG: CPT | Performed by: FAMILY MEDICINE

## 2023-11-13 PROCEDURE — 99284 EMERGENCY DEPT VISIT MOD MDM: CPT

## 2023-11-13 PROCEDURE — 81003 URINALYSIS AUTO W/O SCOPE: CPT | Performed by: FAMILY MEDICINE

## 2023-11-13 PROCEDURE — 3046F HEMOGLOBIN A1C LEVEL >9.0%: CPT | Performed by: FAMILY MEDICINE

## 2023-11-13 PROCEDURE — 80053 COMPREHEN METABOLIC PANEL: CPT

## 2023-11-13 PROCEDURE — 6370000000 HC RX 637 (ALT 250 FOR IP): Performed by: STUDENT IN AN ORGANIZED HEALTH CARE EDUCATION/TRAINING PROGRAM

## 2023-11-13 PROCEDURE — 82962 GLUCOSE BLOOD TEST: CPT

## 2023-11-13 PROCEDURE — 85025 COMPLETE CBC W/AUTO DIFF WBC: CPT

## 2023-11-13 PROCEDURE — 96361 HYDRATE IV INFUSION ADD-ON: CPT

## 2023-11-13 PROCEDURE — 99214 OFFICE O/P EST MOD 30 MIN: CPT | Performed by: FAMILY MEDICINE

## 2023-11-13 RX ORDER — 0.9 % SODIUM CHLORIDE 0.9 %
1000 INTRAVENOUS SOLUTION INTRAVENOUS ONCE
Status: COMPLETED | OUTPATIENT
Start: 2023-11-13 | End: 2023-11-13

## 2023-11-13 RX ORDER — INSULIN DEGLUDEC 100 U/ML
10 INJECTION, SOLUTION SUBCUTANEOUS DAILY
Qty: 6 ML | Refills: 2 | Status: SHIPPED | OUTPATIENT
Start: 2023-11-13 | End: 2024-02-11

## 2023-11-13 RX ORDER — INSULIN DEGLUDEC 200 U/ML
10 INJECTION, SOLUTION SUBCUTANEOUS DAILY
Qty: 1 ADJUSTABLE DOSE PRE-FILLED PEN SYRINGE | Refills: 0 | Status: CANCELLED | COMMUNITY
Start: 2023-11-13

## 2023-11-13 RX ORDER — ACETAMINOPHEN 325 MG/1
650 TABLET ORAL
Status: COMPLETED | OUTPATIENT
Start: 2023-11-13 | End: 2023-11-13

## 2023-11-13 RX ADMIN — SODIUM CHLORIDE 1000 ML: 9 INJECTION, SOLUTION INTRAVENOUS at 13:04

## 2023-11-13 RX ADMIN — ACETAMINOPHEN 650 MG: 325 TABLET ORAL at 15:42

## 2023-11-13 ASSESSMENT — LIFESTYLE VARIABLES
HOW MANY STANDARD DRINKS CONTAINING ALCOHOL DO YOU HAVE ON A TYPICAL DAY: PATIENT DOES NOT DRINK
HOW OFTEN DO YOU HAVE A DRINK CONTAINING ALCOHOL: NEVER

## 2023-11-13 ASSESSMENT — PAIN - FUNCTIONAL ASSESSMENT: PAIN_FUNCTIONAL_ASSESSMENT: 0-10

## 2023-11-13 ASSESSMENT — PAIN SCALES - GENERAL: PAINLEVEL_OUTOF10: 0

## 2023-11-13 NOTE — ED PROVIDER NOTES
MCHC 31.7 31.4 - 35.0 g/dL    RDW 13.1 11.9 - 14.6 %    Platelets 971 519 - 268 K/uL    MPV 9.5 9.4 - 12.3 FL    nRBC 0.00 0.0 - 0.2 K/uL    Differential Type AUTOMATED      Neutrophils % 59 43 - 78 %    Lymphocytes % 29 13 - 44 %    Monocytes % 4 4.0 - 12.0 %    Eosinophils % 7 0.5 - 7.8 %    Basophils % 1 0.0 - 2.0 %    Immature Granulocytes 0 0.0 - 5.0 %    Neutrophils Absolute 4.4 1.7 - 8.2 K/UL    Lymphocytes Absolute 2.1 0.5 - 4.6 K/UL    Monocytes Absolute 0.3 0.1 - 1.3 K/UL    Eosinophils Absolute 0.5 0.0 - 0.8 K/UL    Basophils Absolute 0.1 0.0 - 0.2 K/UL    Absolute Immature Granulocyte 0.0 0.0 - 0.5 K/UL   CMP   Result Value Ref Range    Sodium 132 (L) 133 - 143 mmol/L    Potassium 4.2 3.5 - 5.1 mmol/L    Chloride 96 (L) 98 - 107 mmol/L    CO2 23 21 - 32 mmol/L    Anion Gap 13 (H) 2 - 11 mmol/L    Glucose 530 (HH) 65 - 100 mg/dL    BUN 8 6 - 23 MG/DL    Creatinine 0.66 0.6 - 1.0 MG/DL    Est, Glom Filt Rate >60 >60 ml/min/1.73m2    Calcium 9.4 8.3 - 10.4 MG/DL    Total Bilirubin 0.2 0.2 - 1.1 MG/DL    ALT 30 13.0 - 61.0 U/L    AST 32 15 - 37 U/L    Alk Phosphatase 135 (H) 45.0 - 117.0 U/L    Total Protein 7.9 6.4 - 8.2 g/dL    Albumin 4.0 3.5 - 5.0 g/dL    Globulin 3.9 2.8 - 4.5 g/dL    Albumin/Globulin Ratio 1.0 0.4 - 1.6     Results for orders placed or performed in visit on 11/13/23   AMB POC URINALYSIS DIP STICK AUTO W/O MICRO   Result Value Ref Range    Color (UA POC) Yellow     Clarity (UA POC) Clear     Glucose, Urine, POC 1000 Negative    Bilirubin, Urine, POC Negative Negative    KETONES, Urine, POC 15 Negative    Specific Gravity, Urine, POC 1.005 1.001 - 1.035    Blood (UA POC) Negative Negative    pH, Urine, POC 5.5 4.6 - 8.0    Protein, Urine, POC Negative Negative    Urobilinogen, POC 0.2 mg/dL     Nitrite, Urine, POC Negative Negative    Leukocyte Esterase, Urine, POC Negative Negative   AMB POC GLUCOSE BLOOD, BY GLUCOSE MONITORING DEVICE   Result Value Ref Range    Glucose,  MG/DL

## 2023-11-13 NOTE — PROGRESS NOTES
[address] [address], [address] [place] [address]  Office : [phone]  Fax : [phone]          Subjective: The patient is a 55 y.o. female  who presents for f/u on UNCONTROLLED diabetes -pts blood sugar  NOT stable on med sec to noncompliance-pt was out of med when A1C was high-now taking med daily--need recheck  pts diet/exercise poor  Pt drinks soda daily-eats all the time and no exercises  Pts BS was 560 a day ago  Pt has no abdominal/urinary or cardiopul symptoms  Pts urine shows ketones--pt feels that her mouth is dry    multiple chronic medical conditions-good compliance with medications-pt here to get routeine labs and need refill on meds. no c  ypertension-stable on med  Hyperlipidemia--pts on low carb diet and low fat diet -stable on med  Gerd -stable on diet /med  Chronic lowback pain-stable now--on and off exacerbation  Chronic bilateral shoulder pain--on and off exacerbation-lately right  side is worse and stiff -no radiating pain/numbness or weakness in extremities  Pt sees pain clinic for pain meds-on suboxone-  Psychiatric d/o-sees specialist-- anxiety  -on and off-pt to f/u with special;ist soon        Patient Active Problem List   Diagnosis Code    Schizoaffective disorder, depressive type ([address]) F25.1    Psychiatric disorder F99    Fibromyalgia M79.7    Chronic pain syndrome G89.4    Screening for metabolic disorder S97.707    Screening for STD (sexually transmitted disease) Z11.3    Dietary counseling Z71.3    Obesity, unspecified obesity severity, unspecified obesity type E66.9    Anxiety F41.9    Obesity, morbid ([address]) E66.01    Physical exam Z00.00    Hepatitis C virus infection without hepatic coma B19.20    Hyperlipidemia E78.5    Hypertriglyceridemia E78.1    Elevated BP without diagnosis of hypertension R03.0    History of hepatitis C virus infection Z86.19    Right thigh pain M79.651    Chronic right-sided low back pain with right-sided sciatica M54.41,

## 2023-11-13 NOTE — DISCHARGE INSTRUCTIONS
Stay orally hydrated with water. Adhere to your insulin regimen prescribed by your primary care physician. Check your blood glucose as directed by your primary care physician. Consume a low glucose diet. Return to the ER for worsening or worrisome symptoms.

## 2023-11-13 NOTE — ED TRIAGE NOTES
Pt arrives for complaints of hyperglycemia noted at PCP office today. Told to come in for evaluation. Only takes oral pill for control. Was given insulin for home use today.

## 2023-11-15 RX ORDER — INSULIN GLARGINE 100 [IU]/ML
10 INJECTION, SOLUTION SUBCUTANEOUS NIGHTLY
Qty: 9 ML | Refills: 0 | Status: SHIPPED | OUTPATIENT
Start: 2023-11-15 | End: 2024-02-13

## 2023-11-20 DIAGNOSIS — E11.65 HYPERGLYCEMIA DUE TO DIABETES MELLITUS (HCC): Primary | ICD-10-CM

## 2023-11-20 RX ORDER — INSULIN GLARGINE-YFGN 100 [IU]/ML
10 INJECTION, SOLUTION SUBCUTANEOUS NIGHTLY
Qty: 9 ML | Refills: 0 | Status: SHIPPED | OUTPATIENT
Start: 2023-11-20 | End: 2024-02-18

## 2023-12-13 PROBLEM — E86.0 DEHYDRATION: Status: RESOLVED | Noted: 2023-11-13 | Resolved: 2023-12-13

## 2023-12-19 PROBLEM — Z53.20 MAMMOGRAM DECLINED: Status: ACTIVE | Noted: 2021-07-14

## 2024-02-19 ENCOUNTER — OFFICE VISIT (OUTPATIENT)
Dept: PRIMARY CARE CLINIC | Facility: CLINIC | Age: 49
End: 2024-02-19
Payer: COMMERCIAL

## 2024-02-19 VITALS
HEART RATE: 83 BPM | WEIGHT: 254 LBS | TEMPERATURE: 97.2 F | BODY MASS INDEX: 42.32 KG/M2 | HEIGHT: 65 IN | DIASTOLIC BLOOD PRESSURE: 88 MMHG | SYSTOLIC BLOOD PRESSURE: 140 MMHG | OXYGEN SATURATION: 99 %

## 2024-02-19 DIAGNOSIS — Z71.3 DIETARY COUNSELING AND SURVEILLANCE: ICD-10-CM

## 2024-02-19 DIAGNOSIS — E78.5 HYPERLIPIDEMIA, UNSPECIFIED HYPERLIPIDEMIA TYPE: ICD-10-CM

## 2024-02-19 DIAGNOSIS — G89.4 CHRONIC PAIN SYNDROME: ICD-10-CM

## 2024-02-19 DIAGNOSIS — E13.649 UNCONTROLLED DIABETES MELLITUS OF OTHER TYPE WITH HYPOGLYCEMIA, UNSPECIFIED HYPOGLYCEMIA COMA STATUS (HCC): Primary | ICD-10-CM

## 2024-02-19 DIAGNOSIS — J45.30 MILD PERSISTENT ASTHMA, UNCOMPLICATED: ICD-10-CM

## 2024-02-19 DIAGNOSIS — Z12.31 SCREENING MAMMOGRAM FOR BREAST CANCER: ICD-10-CM

## 2024-02-19 DIAGNOSIS — G89.29 CHRONIC BILATERAL LOW BACK PAIN WITH RIGHT-SIDED SCIATICA: ICD-10-CM

## 2024-02-19 DIAGNOSIS — E66.01 MORBID (SEVERE) OBESITY DUE TO EXCESS CALORIES (HCC): ICD-10-CM

## 2024-02-19 DIAGNOSIS — M54.41 CHRONIC BILATERAL LOW BACK PAIN WITH RIGHT-SIDED SCIATICA: ICD-10-CM

## 2024-02-19 DIAGNOSIS — I10 PRIMARY HYPERTENSION: ICD-10-CM

## 2024-02-19 PROCEDURE — 99214 OFFICE O/P EST MOD 30 MIN: CPT | Performed by: FAMILY MEDICINE

## 2024-02-19 PROCEDURE — 3077F SYST BP >= 140 MM HG: CPT | Performed by: FAMILY MEDICINE

## 2024-02-19 PROCEDURE — 3079F DIAST BP 80-89 MM HG: CPT | Performed by: FAMILY MEDICINE

## 2024-02-19 RX ORDER — LISINOPRIL 5 MG/1
5 TABLET ORAL DAILY
Qty: 90 TABLET | Refills: 0 | Status: SHIPPED | OUTPATIENT
Start: 2024-02-19

## 2024-02-19 RX ORDER — INSULIN DEGLUDEC 100 U/ML
25 INJECTION, SOLUTION SUBCUTANEOUS DAILY
Qty: 50 ML | Refills: 0 | Status: SHIPPED | OUTPATIENT
Start: 2024-02-19 | End: 2024-05-19

## 2024-02-19 RX ORDER — METFORMIN HYDROCHLORIDE 500 MG/1
1000 TABLET, EXTENDED RELEASE ORAL DAILY
Qty: 180 TABLET | Refills: 0 | Status: SHIPPED | OUTPATIENT
Start: 2024-02-19

## 2024-02-19 RX ORDER — ATORVASTATIN CALCIUM 10 MG/1
10 TABLET, FILM COATED ORAL DAILY
Qty: 90 TABLET | Refills: 0 | Status: SHIPPED | OUTPATIENT
Start: 2024-02-19

## 2024-02-19 ASSESSMENT — PATIENT HEALTH QUESTIONNAIRE - PHQ9
10. IF YOU CHECKED OFF ANY PROBLEMS, HOW DIFFICULT HAVE THESE PROBLEMS MADE IT FOR YOU TO DO YOUR WORK, TAKE CARE OF THINGS AT HOME, OR GET ALONG WITH OTHER PEOPLE: 0
3. TROUBLE FALLING OR STAYING ASLEEP: 0
9. THOUGHTS THAT YOU WOULD BE BETTER OFF DEAD, OR OF HURTING YOURSELF: 0
1. LITTLE INTEREST OR PLEASURE IN DOING THINGS: 0
SUM OF ALL RESPONSES TO PHQ QUESTIONS 1-9: 1
SUM OF ALL RESPONSES TO PHQ QUESTIONS 1-9: 1
5. POOR APPETITE OR OVEREATING: 0
6. FEELING BAD ABOUT YOURSELF - OR THAT YOU ARE A FAILURE OR HAVE LET YOURSELF OR YOUR FAMILY DOWN: 0
8. MOVING OR SPEAKING SO SLOWLY THAT OTHER PEOPLE COULD HAVE NOTICED. OR THE OPPOSITE, BEING SO FIGETY OR RESTLESS THAT YOU HAVE BEEN MOVING AROUND A LOT MORE THAN USUAL: 0
4. FEELING TIRED OR HAVING LITTLE ENERGY: 0
SUM OF ALL RESPONSES TO PHQ QUESTIONS 1-9: 1
2. FEELING DOWN, DEPRESSED OR HOPELESS: 1
SUM OF ALL RESPONSES TO PHQ QUESTIONS 1-9: 1
SUM OF ALL RESPONSES TO PHQ9 QUESTIONS 1 & 2: 1
7. TROUBLE CONCENTRATING ON THINGS, SUCH AS READING THE NEWSPAPER OR WATCHING TELEVISION: 0

## 2024-02-19 NOTE — PROGRESS NOTES
[facility]  [address]  [place] [address]  Office : [phone]  Fax : [phone]          Subjective:  The patient is a 46 y.o. female  who presents for f/u on multiple chronic medical conditions-good compliance with medications-pt here to get routeine labs and need refill on meds.no c  ypertension-stable on med  UNCONTROLLED diabetes -pts blood sugar  NOT stable on med sec to noncompliance-pt was out of med when A1C was high-now taking med daily--need recheck  Pt on insulin  pts diet/exercise poor  Pt drinks soda daily-eats all the time and no exercises  Pts BS was 560 last month now averages 150 in 12/2023  Pt has no abdominal/urinary or cardiopul symptoms    Hyperlipidemia--pts on low carb diet and low fat diet -stable on med  Gerd -stable on diet /med  Chronic lowback pain-stable now--on and off exacerbation  Chronic bilateral shoulder pain--on and off exacerbation-lately right  side is worse and stiff -no radiating pain/numbness or weakness in extremities  Pt sees pain clinic for pain meds-on suboxone-  Psychiatric d/o-sees specialist-- anxiety  -on and off-pt to f/u with special;ist soon        Patient Active Problem List   Diagnosis Code    Schizoaffective disorder, depressive type (HCC) F25.1    Psychiatric disorder F99    Fibromyalgia M79.7    Chronic pain syndrome G89.4    Screening for metabolic disorder Z13.228    Screening for STD (sexually transmitted disease) Z11.3    Dietary counseling Z71.3    Obesity, unspecified obesity severity, unspecified obesity type E66.9    Anxiety F41.9    Obesity, morbid (HCC) E66.01    Physical exam Z00.00    Hepatitis C virus infection without hepatic coma B19.20    Hyperlipidemia E78.5    Hypertriglyceridemia E78.1    Elevated BP without diagnosis of hypertension R03.0    History of hepatitis C virus infection Z86.19    Right thigh pain M79.651    Chronic right-sided low back pain with right-sided sciatica M54.41, G89.29    Low

## 2024-02-23 RX ORDER — INSULIN GLARGINE-YFGN 100 [IU]/ML
INJECTION, SOLUTION SUBCUTANEOUS
OUTPATIENT
Start: 2024-02-23

## 2024-04-01 RX ORDER — INSULIN GLARGINE-YFGN 100 [IU]/ML
INJECTION, SOLUTION SUBCUTANEOUS
OUTPATIENT
Start: 2024-04-01

## 2024-04-05 ENCOUNTER — TELEPHONE (OUTPATIENT)
Dept: PRIMARY CARE CLINIC | Facility: CLINIC | Age: 49
End: 2024-04-05

## 2024-04-05 DIAGNOSIS — E13.649 UNCONTROLLED DIABETES MELLITUS OF OTHER TYPE WITH HYPOGLYCEMIA, UNSPECIFIED HYPOGLYCEMIA COMA STATUS (HCC): Primary | ICD-10-CM

## 2024-04-05 RX ORDER — INSULIN GLARGINE 100 [IU]/ML
10 INJECTION, SOLUTION SUBCUTANEOUS NIGHTLY
Qty: 9 ML | Refills: 1 | Status: SHIPPED | OUTPATIENT
Start: 2024-04-05

## 2024-04-05 NOTE — TELEPHONE ENCOUNTER
Spoke with patient,   Patient needs refill on insulin glargine-10 units daily  [facility] [place].

## 2024-04-05 NOTE — TELEPHONE ENCOUNTER
Pt requesting medication refill on insuline glargine,please call pt back, thank you.  Pharmacy [facility].[phone].

## 2024-05-09 DIAGNOSIS — I10 PRIMARY HYPERTENSION: ICD-10-CM

## 2024-05-09 DIAGNOSIS — E13.649 UNCONTROLLED DIABETES MELLITUS OF OTHER TYPE WITH HYPOGLYCEMIA, UNSPECIFIED HYPOGLYCEMIA COMA STATUS (HCC): ICD-10-CM

## 2024-05-09 DIAGNOSIS — J45.30 MILD PERSISTENT ASTHMA, UNCOMPLICATED: ICD-10-CM

## 2024-05-09 DIAGNOSIS — E78.5 HYPERLIPIDEMIA, UNSPECIFIED HYPERLIPIDEMIA TYPE: ICD-10-CM

## 2024-05-09 DIAGNOSIS — G89.29 CHRONIC BILATERAL LOW BACK PAIN WITH RIGHT-SIDED SCIATICA: ICD-10-CM

## 2024-05-09 DIAGNOSIS — G89.4 CHRONIC PAIN SYNDROME: ICD-10-CM

## 2024-05-09 DIAGNOSIS — E66.01 MORBID (SEVERE) OBESITY DUE TO EXCESS CALORIES (HCC): ICD-10-CM

## 2024-05-09 DIAGNOSIS — M54.41 CHRONIC BILATERAL LOW BACK PAIN WITH RIGHT-SIDED SCIATICA: ICD-10-CM

## 2024-05-09 DIAGNOSIS — Z71.3 DIETARY COUNSELING AND SURVEILLANCE: ICD-10-CM

## 2024-05-09 LAB
ALBUMIN SERPL-MCNC: 3.3 G/DL (ref 3.5–5)
ALBUMIN/GLOB SERPL: 0.9 (ref 1–1.9)
ALP SERPL-CCNC: 113 U/L (ref 35–104)
ALT SERPL-CCNC: 20 U/L (ref 12–65)
ANION GAP SERPL CALC-SCNC: 10 MMOL/L (ref 9–18)
AST SERPL-CCNC: 20 U/L (ref 15–37)
BILIRUB SERPL-MCNC: 0.2 MG/DL (ref 0–1.2)
BUN SERPL-MCNC: 18 MG/DL (ref 6–23)
CALCIUM SERPL-MCNC: 9.1 MG/DL (ref 8.8–10.2)
CHLORIDE SERPL-SCNC: 101 MMOL/L (ref 98–107)
CO2 SERPL-SCNC: 26 MMOL/L (ref 20–28)
CREAT SERPL-MCNC: 0.66 MG/DL (ref 0.6–1.1)
EST. AVERAGE GLUCOSE BLD GHB EST-MCNC: 199 MG/DL
GLOBULIN SER CALC-MCNC: 3.8 G/DL (ref 2.3–3.5)
GLUCOSE SERPL-MCNC: 215 MG/DL (ref 70–99)
HBA1C MFR BLD: 8.6 % (ref 0–5.6)
POTASSIUM SERPL-SCNC: 4.1 MMOL/L (ref 3.5–5.1)
PROT SERPL-MCNC: 7.1 G/DL (ref 6.3–8.2)
SODIUM SERPL-SCNC: 137 MMOL/L (ref 136–145)

## 2024-05-20 ENCOUNTER — OFFICE VISIT (OUTPATIENT)
Dept: PRIMARY CARE CLINIC | Facility: CLINIC | Age: 49
End: 2024-05-20
Payer: COMMERCIAL

## 2024-05-20 VITALS
TEMPERATURE: 97.2 F | DIASTOLIC BLOOD PRESSURE: 80 MMHG | HEIGHT: 65 IN | SYSTOLIC BLOOD PRESSURE: 156 MMHG | BODY MASS INDEX: 47.32 KG/M2 | HEART RATE: 97 BPM | OXYGEN SATURATION: 96 % | WEIGHT: 284 LBS

## 2024-05-20 DIAGNOSIS — I10 PRIMARY HYPERTENSION: ICD-10-CM

## 2024-05-20 DIAGNOSIS — J45.30 MILD PERSISTENT ASTHMA, UNCOMPLICATED: ICD-10-CM

## 2024-05-20 DIAGNOSIS — M54.41 CHRONIC BILATERAL LOW BACK PAIN WITH RIGHT-SIDED SCIATICA: ICD-10-CM

## 2024-05-20 DIAGNOSIS — E13.649 UNCONTROLLED DIABETES MELLITUS OF OTHER TYPE WITH HYPOGLYCEMIA, UNSPECIFIED HYPOGLYCEMIA COMA STATUS (HCC): Primary | ICD-10-CM

## 2024-05-20 DIAGNOSIS — G89.29 CHRONIC BILATERAL LOW BACK PAIN WITH RIGHT-SIDED SCIATICA: ICD-10-CM

## 2024-05-20 DIAGNOSIS — M25.511 CHRONIC RIGHT SHOULDER PAIN: ICD-10-CM

## 2024-05-20 DIAGNOSIS — E66.01 MORBID (SEVERE) OBESITY DUE TO EXCESS CALORIES (HCC): ICD-10-CM

## 2024-05-20 DIAGNOSIS — E78.5 HYPERLIPIDEMIA, UNSPECIFIED HYPERLIPIDEMIA TYPE: ICD-10-CM

## 2024-05-20 DIAGNOSIS — E11.65 HYPERGLYCEMIA DUE TO DIABETES MELLITUS (HCC): ICD-10-CM

## 2024-05-20 DIAGNOSIS — Z71.3 DIETARY COUNSELING AND SURVEILLANCE: ICD-10-CM

## 2024-05-20 DIAGNOSIS — G89.29 CHRONIC RIGHT SHOULDER PAIN: ICD-10-CM

## 2024-05-20 PROCEDURE — 3079F DIAST BP 80-89 MM HG: CPT | Performed by: FAMILY MEDICINE

## 2024-05-20 PROCEDURE — 3077F SYST BP >= 140 MM HG: CPT | Performed by: FAMILY MEDICINE

## 2024-05-20 PROCEDURE — 99214 OFFICE O/P EST MOD 30 MIN: CPT | Performed by: FAMILY MEDICINE

## 2024-05-20 PROCEDURE — 3052F HG A1C>EQUAL 8.0%<EQUAL 9.0%: CPT | Performed by: FAMILY MEDICINE

## 2024-05-20 RX ORDER — INSULIN GLARGINE 100 [IU]/ML
28 INJECTION, SOLUTION SUBCUTANEOUS NIGHTLY
Qty: 25.2 ML | Refills: 0 | Status: SHIPPED | OUTPATIENT
Start: 2024-05-20 | End: 2024-08-18

## 2024-05-20 RX ORDER — ATORVASTATIN CALCIUM 10 MG/1
10 TABLET, FILM COATED ORAL DAILY
Qty: 90 TABLET | Refills: 0 | Status: SHIPPED | OUTPATIENT
Start: 2024-05-20

## 2024-05-20 RX ORDER — LISINOPRIL 5 MG/1
5 TABLET ORAL DAILY
Qty: 90 TABLET | Refills: 0 | Status: SHIPPED | OUTPATIENT
Start: 2024-05-20

## 2024-05-20 RX ORDER — METFORMIN HYDROCHLORIDE 500 MG/1
1000 TABLET, EXTENDED RELEASE ORAL DAILY
Qty: 180 TABLET | Refills: 0 | Status: SHIPPED | OUTPATIENT
Start: 2024-05-20

## 2024-05-20 RX ORDER — ALBUTEROL SULFATE 90 UG/1
2 AEROSOL, METERED RESPIRATORY (INHALATION) EVERY 4 HOURS PRN
Qty: 18 G | Refills: 2 | Status: SHIPPED | OUTPATIENT
Start: 2024-05-20

## 2024-06-13 ENCOUNTER — TELEPHONE (OUTPATIENT)
Dept: PRIMARY CARE CLINIC | Facility: CLINIC | Age: 49
End: 2024-06-13

## 2024-06-13 NOTE — TELEPHONE ENCOUNTER
Patient called requesting refill insulin syringe-needle u-100 30g  and insulin pen, patient is out of medication    [facility]    Thank you

## 2024-06-13 NOTE — TELEPHONE ENCOUNTER
Spoke with patient, she was unable to get her insulin.  Insurance is not covering Basaglar.  I did the PA; approval pending.

## 2024-06-17 NOTE — TELEPHONE ENCOUNTER
Insuling Basaglar was approved until 6/14/2025.  Pharmacist was notified.  Called, left voicemail for patient.

## 2024-07-31 DIAGNOSIS — E13.649 UNCONTROLLED DIABETES MELLITUS OF OTHER TYPE WITH HYPOGLYCEMIA, UNSPECIFIED HYPOGLYCEMIA COMA STATUS (HCC): ICD-10-CM

## 2024-07-31 RX ORDER — INSULIN GLARGINE 100 [IU]/ML
28 INJECTION, SOLUTION SUBCUTANEOUS NIGHTLY
OUTPATIENT
Start: 2024-07-31 | End: 2024-10-29

## 2024-11-26 ENCOUNTER — TELEPHONE (OUTPATIENT)
Dept: PRIMARY CARE CLINIC | Facility: CLINIC | Age: 49
End: 2024-11-26

## 2024-11-26 VITALS — BODY MASS INDEX: 36.61 KG/M2 | WEIGHT: 220 LBS

## 2024-11-26 DIAGNOSIS — E13.649 UNCONTROLLED DIABETES MELLITUS OF OTHER TYPE WITH HYPOGLYCEMIA, UNSPECIFIED HYPOGLYCEMIA COMA STATUS (HCC): ICD-10-CM

## 2024-11-26 RX ORDER — INSULIN GLARGINE 100 [IU]/ML
28 INJECTION, SOLUTION SUBCUTANEOUS NIGHTLY
Qty: 25.2 ML | Refills: 0 | Status: SHIPPED | OUTPATIENT
Start: 2024-11-26 | End: 2025-02-24

## 2024-11-26 NOTE — TELEPHONE ENCOUNTER
Pt requesting  medication refill on basaglar insuline 100 unit/ml,28 units nightly. [facility] [place], please call pt back, thank you.

## 2024-11-27 ENCOUNTER — TELEPHONE (OUTPATIENT)
Dept: PRIMARY CARE CLINIC | Facility: CLINIC | Age: 49
End: 2024-11-27

## 2024-11-27 NOTE — TELEPHONE ENCOUNTER
Pt insurance requesting PA for basaglar 100 unit, or to send in a different medication lantus solostar 100 unit [employer]ofiaventis,please call pt back, thank you.  [facility] [phone] .

## 2024-11-29 DIAGNOSIS — E13.649 UNCONTROLLED DIABETES MELLITUS OF OTHER TYPE WITH HYPOGLYCEMIA, UNSPECIFIED HYPOGLYCEMIA COMA STATUS (HCC): ICD-10-CM

## 2024-12-02 DIAGNOSIS — E13.649 UNCONTROLLED DIABETES MELLITUS OF OTHER TYPE WITH HYPOGLYCEMIA, UNSPECIFIED HYPOGLYCEMIA COMA STATUS (HCC): Primary | ICD-10-CM

## 2024-12-02 RX ORDER — INSULIN GLARGINE 100 [IU]/ML
10 INJECTION, SOLUTION SUBCUTANEOUS NIGHTLY
Refills: 1 | OUTPATIENT
Start: 2024-12-02

## 2024-12-02 RX ORDER — INSULIN GLARGINE 100 [IU]/ML
28 INJECTION, SOLUTION SUBCUTANEOUS NIGHTLY
Qty: 25.2 ML | Refills: 0 | Status: SHIPPED | OUTPATIENT
Start: 2024-12-02 | End: 2025-03-02

## 2024-12-03 DIAGNOSIS — E11.65 HYPERGLYCEMIA DUE TO DIABETES MELLITUS (HCC): ICD-10-CM

## 2024-12-03 RX ORDER — PEN NEEDLE, DIABETIC 29 G X1/2"
NEEDLE, DISPOSABLE MISCELLANEOUS
Refills: 4 | OUTPATIENT
Start: 2024-12-03

## 2025-01-22 DIAGNOSIS — E13.649 UNCONTROLLED DIABETES MELLITUS OF OTHER TYPE WITH HYPOGLYCEMIA, UNSPECIFIED HYPOGLYCEMIA COMA STATUS (HCC): ICD-10-CM

## 2025-01-23 RX ORDER — INSULIN GLARGINE 100 [IU]/ML
28 INJECTION, SOLUTION SUBCUTANEOUS NIGHTLY
OUTPATIENT
Start: 2025-01-23 | End: 2025-04-23

## 2025-01-23 NOTE — TELEPHONE ENCOUNTER
Pt has not been seen since 5/20/24.  She will need to schedule an appt prior to refills being sent in.

## 2025-02-03 PROCEDURE — 99284 EMERGENCY DEPT VISIT MOD MDM: CPT

## 2025-02-04 ENCOUNTER — HOSPITAL ENCOUNTER (EMERGENCY)
Age: 50
Discharge: HOME | End: 2025-02-04
Attending: EMERGENCY MEDICINE
Payer: COMMERCIAL

## 2025-02-04 VITALS
TEMPERATURE: 98.3 F | DIASTOLIC BLOOD PRESSURE: 133 MMHG | WEIGHT: 280 LBS | SYSTOLIC BLOOD PRESSURE: 190 MMHG | OXYGEN SATURATION: 99 % | HEART RATE: 114 BPM | RESPIRATION RATE: 18 BRPM | BODY MASS INDEX: 46.59 KG/M2
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
## 2025-02-04 DIAGNOSIS — S01.81XA FACIAL LACERATION, INITIAL ENCOUNTER: Primary | ICD-10-CM

## 2025-02-04 PROCEDURE — 90714 TD VACC NO PRESV 7 YRS+ IM: CPT | Performed by: EMERGENCY MEDICINE

## 2025-02-04 PROCEDURE — 6370000000 HC RX 637 (ALT 250 FOR IP): Performed by: EMERGENCY MEDICINE

## 2025-02-04 PROCEDURE — 12052 INTMD RPR FACE/MM 2.6-5.0 CM: CPT

## 2025-02-04 PROCEDURE — 6360000002 HC RX W HCPCS: Performed by: EMERGENCY MEDICINE

## 2025-02-04 PROCEDURE — 90471 IMMUNIZATION ADMIN: CPT | Performed by: EMERGENCY MEDICINE

## 2025-02-04 RX ORDER — ALPRAZOLAM 0.5 MG
0.5 TABLET ORAL
Status: COMPLETED | OUTPATIENT
Start: 2025-02-04 | End: 2025-02-04

## 2025-02-04 RX ORDER — TETRACAINE HYDROCHLORIDE 5 MG/ML
2 SOLUTION OPHTHALMIC
Status: COMPLETED | OUTPATIENT
Start: 2025-02-04 | End: 2025-02-04

## 2025-02-04 RX ORDER — HYDROCODONE BITARTRATE AND ACETAMINOPHEN 5; 325 MG/1; MG/1
1 TABLET ORAL
Status: COMPLETED | OUTPATIENT
Start: 2025-02-04 | End: 2025-02-04

## 2025-02-04 RX ADMIN — AMOXICILLIN AND CLAVULANATE POTASSIUM 1 TABLET: 875; 125 TABLET, FILM COATED ORAL at 00:52

## 2025-02-04 RX ADMIN — ALPRAZOLAM 0.5 MG: 0.5 TABLET ORAL at 00:53

## 2025-02-04 RX ADMIN — FLUORESCEIN SODIUM 1 MG: 1 STRIP OPHTHALMIC at 00:52

## 2025-02-04 RX ADMIN — Medication 3 ML: at 01:01

## 2025-02-04 RX ADMIN — TETRACAINE HYDROCHLORIDE 2 DROP: 5 SOLUTION OPHTHALMIC at 00:52

## 2025-02-04 RX ADMIN — HYDROCODONE BITARTRATE AND ACETAMINOPHEN 1 TABLET: 5; 325 TABLET ORAL at 02:24

## 2025-02-04 RX ADMIN — CLOSTRIDIUM TETANI TOXOID ANTIGEN (FORMALDEHYDE INACTIVATED) AND CORYNEBACTERIUM DIPHTHERIAE TOXOID ANTIGEN (FORMALDEHYDE INACTIVATED) 0.5 ML: 5; 2 INJECTION, SUSPENSION INTRAMUSCULAR at 01:00

## 2025-02-04 ASSESSMENT — PAIN SCALES - GENERAL: PAINLEVEL_OUTOF10: 6

## 2025-02-04 ASSESSMENT — PAIN - FUNCTIONAL ASSESSMENT: PAIN_FUNCTIONAL_ASSESSMENT: 0-10

## 2025-02-04 NOTE — ED PROVIDER NOTES
band  Anesthesia:     Anesthesia method:  Topical application and local infiltration    Topical anesthetic:  LET    Local anesthetic:  Lidocaine 1% WITH epi  Laceration details:     Location:  Face    Face location:  L upper eyelid    Extent:  Partial thickness    Length (cm):  3  Pre-procedure details:     Preparation:  Patient was prepped and draped in usual sterile fashion  Exploration:     Hemostasis achieved with:  LET and epinephrine    Wound exploration: wound explored through full range of motion and entire depth of wound visualized      Contaminated: yes    Treatment:     Area cleansed with:  Povidone-iodine and saline    Amount of cleaning:  Extensive    Irrigation solution:  Sterile saline    Irrigation method:  Syringe    Visualized foreign bodies/material removed: no    Skin repair:     Repair method:  Sutures    Suture size:  6-0    Suture material:  Prolene    Suture technique:  Simple interrupted and running    Number of sutures:  7  Approximation:     Approximation:  Close  Repair type:     Repair type:  Intermediate  Post-procedure details:     Dressing:  Antibiotic ointment    Procedure completion:  Tolerated well, no immediate complications  Comments:      4 running sutures placed to upper aspect of laceration.  3 interrupted sutures placed to macerated lateral and inferior edges as imaged      Orders placed during this emergency department visit:     Orders Placed This Encounter   Procedures    LACERATION REPAIR    Clean Wound        Medications given during this emergency department visit:     Medications   HYDROcodone-acetaminophen (NORCO) 5-325 MG per tablet 1 tablet (has no administration in time range)   lidocaine-EPINEPHrine-tetracaine (LET) topical gel 3 mL syringe (3 mLs Topical Given 2/4/25 0101)   ALPRAZolam (XANAX) tablet 0.5 mg (0.5 mg Oral Given 2/4/25 0053)   fluorescein ophthalmic strip 1 mg (1 mg Ophthalmic Given 2/4/25 0052)   tetracaine (TETRAVISC) 0.5 % ophthalmic solution 2

## 2025-02-04 NOTE — ED TRIAGE NOTES
Pt attacked by cat tonight at her house. Pt unsure of who's cat it was or if it was a bobcat. Pt states unsure of last tetanus. Pt alert and oriented. Pt has laceration to left eyelid.

## 2025-02-04 NOTE — DISCHARGE INSTRUCTIONS
Apply thin layer of antibiotic ointment daily.  Return for suture removal in 5 days.  Skin may turn black and fall off over time but you will regrow new skin.  Apply ice.  Take Tylenol and Motrin as needed for pain.  You are at very high risk for infection even while taking antibiotics.  You must return for signs of infection such as redness, drainage, fever, severe pain.

## 2025-02-09 ENCOUNTER — HOSPITAL ENCOUNTER (EMERGENCY)
Age: 50
Discharge: HOME | End: 2025-02-09

## 2025-02-09 VITALS
DIASTOLIC BLOOD PRESSURE: 113 MMHG | HEART RATE: 106 BPM | TEMPERATURE: 98.2 F | BODY MASS INDEX: 46.65 KG/M2 | OXYGEN SATURATION: 95 % | SYSTOLIC BLOOD PRESSURE: 165 MMHG | RESPIRATION RATE: 18 BRPM | HEIGHT: 65 IN | WEIGHT: 280 LBS

## 2025-02-09 DIAGNOSIS — Z48.02 VISIT FOR SUTURE REMOVAL: Primary | ICD-10-CM

## 2025-02-09 RX ORDER — BUTALBITAL, ACETAMINOPHEN AND CAFFEINE 50; 325; 40 MG/1; MG/1; MG/1
1 TABLET ORAL EVERY 4 HOURS PRN
Qty: 20 TABLET | Refills: 0 | Status: SHIPPED | OUTPATIENT
Start: 2025-02-09

## 2025-02-09 RX ORDER — DOXYCYCLINE HYCLATE 100 MG
100 TABLET ORAL 2 TIMES DAILY
Qty: 14 TABLET | Refills: 0 | Status: SHIPPED | OUTPATIENT
Start: 2025-02-09 | End: 2025-02-11 | Stop reason: ALTCHOICE

## 2025-02-09 ASSESSMENT — PAIN - FUNCTIONAL ASSESSMENT: PAIN_FUNCTIONAL_ASSESSMENT: 0-10

## 2025-02-09 ASSESSMENT — PAIN SCALES - GENERAL: PAINLEVEL_OUTOF10: 9

## 2025-02-09 NOTE — ED TRIAGE NOTES
Pt ambulatory to triage with steady gait. Pt here for suture removal above left eye that were placed on 2/4

## 2025-02-09 NOTE — ED PROVIDER NOTES
Emergency Department Provider Note       PCP: Yvonne Savage MD   Age: 49 y.o.   Sex: female     DISPOSITION      ICD-10-CM    1. Visit for suture removal  Z48.02           Medical Decision Making     49-year-old female presents emergency department today for suture removal.  She appears in no acute distress.  No signs of infection to the wound.  It does have a lot of scabbing noted.  She would like me to proceed with suture removal.  Sutures removed without difficulty.  No wound separation.  Continued wound care discussed.  Advised her to keep a thin layer of Aquaphor or Vaseline on the wound.  She request prescription for Percocet for the pain.  Will send in prescription for Fioricet and discussed with the patient that Percocet is not appropriate treatment for this pain.  She requests a different prescription for a different antibiotic.  I do not note any signs of infection at this time.  Will send in doxycycline but patient is aware that she does not necessarily need to take it.  Encouraged follow-up with PCP.  ER return precautions discussed.     1 acute, uncomplicated illness or injury.  Shared medical decision making was utilized in creating the patients health plan today.  I independently ordered and reviewed each unique test.                         History     49-year-old female presents emergency department today for suture removal.  She states that she got bit by cat on February 4 and has sutures to the left eyebrow.  She reports some discomfort to the wound.  She states that she took some Percocet that was not prescribed to her for the pain.  She was prescribed Augmentin but states that she only took it for 2 days and then stopped taking it because she is \"deathly allergic\" to penicillins.  She states that she did not have any reaction to the Augmentin because the allergic reaction started after 3 days of the antibiotic.    The history is provided by the patient.     Physical Exam     Vitals signs

## 2025-02-11 ENCOUNTER — OFFICE VISIT (OUTPATIENT)
Dept: PRIMARY CARE CLINIC | Facility: CLINIC | Age: 50
End: 2025-02-11
Payer: COMMERCIAL

## 2025-02-11 VITALS
WEIGHT: 272 LBS | HEART RATE: 99 BPM | BODY MASS INDEX: 45.32 KG/M2 | OXYGEN SATURATION: 94 % | TEMPERATURE: 97.9 F | SYSTOLIC BLOOD PRESSURE: 174 MMHG | RESPIRATION RATE: 17 BRPM | DIASTOLIC BLOOD PRESSURE: 100 MMHG | HEIGHT: 65 IN

## 2025-02-11 DIAGNOSIS — G89.29 CHRONIC BILATERAL LOW BACK PAIN WITH RIGHT-SIDED SCIATICA: ICD-10-CM

## 2025-02-11 DIAGNOSIS — M54.41 CHRONIC BILATERAL LOW BACK PAIN WITH RIGHT-SIDED SCIATICA: ICD-10-CM

## 2025-02-11 DIAGNOSIS — E13.649 UNCONTROLLED DIABETES MELLITUS OF OTHER TYPE WITH HYPOGLYCEMIA, UNSPECIFIED HYPOGLYCEMIA COMA STATUS (HCC): Primary | ICD-10-CM

## 2025-02-11 DIAGNOSIS — E66.01 MORBID (SEVERE) OBESITY DUE TO EXCESS CALORIES: ICD-10-CM

## 2025-02-11 DIAGNOSIS — I10 PRIMARY HYPERTENSION: ICD-10-CM

## 2025-02-11 DIAGNOSIS — J45.30 MILD PERSISTENT ASTHMA, UNCOMPLICATED: ICD-10-CM

## 2025-02-11 DIAGNOSIS — Z71.3 DIETARY COUNSELING AND SURVEILLANCE: ICD-10-CM

## 2025-02-11 DIAGNOSIS — E78.5 HYPERLIPIDEMIA, UNSPECIFIED HYPERLIPIDEMIA TYPE: ICD-10-CM

## 2025-02-11 LAB — GLUCOSE, POC: 368 MG/DL

## 2025-02-11 PROCEDURE — 82962 GLUCOSE BLOOD TEST: CPT | Performed by: FAMILY MEDICINE

## 2025-02-11 PROCEDURE — 3077F SYST BP >= 140 MM HG: CPT | Performed by: FAMILY MEDICINE

## 2025-02-11 PROCEDURE — 99214 OFFICE O/P EST MOD 30 MIN: CPT | Performed by: FAMILY MEDICINE

## 2025-02-11 PROCEDURE — 3080F DIAST BP >= 90 MM HG: CPT | Performed by: FAMILY MEDICINE

## 2025-02-11 RX ORDER — LISINOPRIL 20 MG/1
20 TABLET ORAL DAILY
Qty: 90 TABLET | Refills: 0 | Status: SHIPPED | OUTPATIENT
Start: 2025-02-11

## 2025-02-11 RX ORDER — INSULIN GLARGINE 100 [IU]/ML
35 INJECTION, SOLUTION SUBCUTANEOUS NIGHTLY
Qty: 31.5 ML | Refills: 0 | Status: SHIPPED | OUTPATIENT
Start: 2025-02-11 | End: 2025-05-12

## 2025-02-11 RX ORDER — BLOOD-GLUCOSE METER
1 EACH MISCELLANEOUS DAILY
Qty: 1 KIT | Refills: 0 | Status: SHIPPED | OUTPATIENT
Start: 2025-02-11

## 2025-02-11 RX ORDER — LANCETS 30 GAUGE
1 EACH MISCELLANEOUS DAILY
Qty: 100 EACH | Refills: 3 | Status: SHIPPED | OUTPATIENT
Start: 2025-02-11

## 2025-02-11 RX ORDER — METFORMIN HYDROCHLORIDE 500 MG/1
1000 TABLET, EXTENDED RELEASE ORAL DAILY
Qty: 180 TABLET | Refills: 0 | Status: SHIPPED | OUTPATIENT
Start: 2025-02-11

## 2025-02-11 RX ORDER — ATORVASTATIN CALCIUM 10 MG/1
10 TABLET, FILM COATED ORAL DAILY
Qty: 90 TABLET | Refills: 0 | Status: SHIPPED | OUTPATIENT
Start: 2025-02-11

## 2025-02-11 RX ORDER — ALBUTEROL SULFATE 90 UG/1
2 INHALANT RESPIRATORY (INHALATION) EVERY 4 HOURS PRN
Qty: 18 G | Refills: 2 | Status: SHIPPED | OUTPATIENT
Start: 2025-02-11

## 2025-02-11 SDOH — ECONOMIC STABILITY: FOOD INSECURITY: WITHIN THE PAST 12 MONTHS, YOU WORRIED THAT YOUR FOOD WOULD RUN OUT BEFORE YOU GOT MONEY TO BUY MORE.: NEVER TRUE

## 2025-02-11 SDOH — ECONOMIC STABILITY: FOOD INSECURITY: WITHIN THE PAST 12 MONTHS, THE FOOD YOU BOUGHT JUST DIDN'T LAST AND YOU DIDN'T HAVE MONEY TO GET MORE.: NEVER TRUE

## 2025-02-11 ASSESSMENT — PATIENT HEALTH QUESTIONNAIRE - PHQ9
SUM OF ALL RESPONSES TO PHQ QUESTIONS 1-9: 0
SUM OF ALL RESPONSES TO PHQ QUESTIONS 1-9: 0
4. FEELING TIRED OR HAVING LITTLE ENERGY: NOT AT ALL
3. TROUBLE FALLING OR STAYING ASLEEP: NOT AT ALL
6. FEELING BAD ABOUT YOURSELF - OR THAT YOU ARE A FAILURE OR HAVE LET YOURSELF OR YOUR FAMILY DOWN: NOT AT ALL
1. LITTLE INTEREST OR PLEASURE IN DOING THINGS: NOT AT ALL
SUM OF ALL RESPONSES TO PHQ QUESTIONS 1-9: 0
5. POOR APPETITE OR OVEREATING: NOT AT ALL
8. MOVING OR SPEAKING SO SLOWLY THAT OTHER PEOPLE COULD HAVE NOTICED. OR THE OPPOSITE, BEING SO FIGETY OR RESTLESS THAT YOU HAVE BEEN MOVING AROUND A LOT MORE THAN USUAL: NOT AT ALL
SUM OF ALL RESPONSES TO PHQ QUESTIONS 1-9: 0
9. THOUGHTS THAT YOU WOULD BE BETTER OFF DEAD, OR OF HURTING YOURSELF: NOT AT ALL
7. TROUBLE CONCENTRATING ON THINGS, SUCH AS READING THE NEWSPAPER OR WATCHING TELEVISION: NOT AT ALL
2. FEELING DOWN, DEPRESSED OR HOPELESS: NOT AT ALL
10. IF YOU CHECKED OFF ANY PROBLEMS, HOW DIFFICULT HAVE THESE PROBLEMS MADE IT FOR YOU TO DO YOUR WORK, TAKE CARE OF THINGS AT HOME, OR GET ALONG WITH OTHER PEOPLE: NOT DIFFICULT AT ALL
SUM OF ALL RESPONSES TO PHQ9 QUESTIONS 1 & 2: 0

## 2025-02-11 NOTE — PROGRESS NOTES
2/11/2025, Disp-100 each, R-3, NormalONE TOUCH ULTRA 2 BRAND  -     Lancets MISC; DAILY Starting Tue 2/11/2025, Disp-100 each, R-3, Normal  2. Primary hypertension  Overview:  Stable with med  Refilled  Labs   Annual eye exam recommended  F/u in 3 months    Orders:  -     lisinopril (PRINIVIL;ZESTRIL) 20 MG tablet; Take 1 tablet by mouth daily Dose increased to 20 mg daily on 2/11/25, Disp-90 tablet, R-0Normal  -     Albumin/Creatinine Ratio, Urine; Future  -     Hemoglobin A1C; Future  -     Lipid Panel; Future  -     Comprehensive Metabolic Panel; Future  -     Blood Glucose Monitoring Suppl (ONE TOUCH ULTRA 2) w/Device KIT; DAILY Starting Tue 2/11/2025, Disp-1 kit, R-0, Normal  -     blood glucose test strips (ASCENSIA AUTODISC VI;ONE TOUCH ULTRA TEST VI) strip; DAILY Starting Tue 2/11/2025, Disp-100 each, R-3, NormalONE TOUCH ULTRA 2 BRAND  -     Lancets MISC; DAILY Starting Tue 2/11/2025, Disp-100 each, R-3, Normal  3. Hyperlipidemia, unspecified hyperlipidemia type  Overview:  Diet controlled  low fat diet      Orders:  -     atorvastatin (LIPITOR) 10 MG tablet; Take 1 tablet by mouth daily, Disp-90 tablet, R-0Normal  -     Albumin/Creatinine Ratio, Urine; Future  -     Hemoglobin A1C; Future  -     Lipid Panel; Future  -     Comprehensive Metabolic Panel; Future  -     Blood Glucose Monitoring Suppl (ONE TOUCH ULTRA 2) w/Device KIT; DAILY Starting Tue 2/11/2025, Disp-1 kit, R-0, Normal  -     blood glucose test strips (ASCENSIA AUTODISC VI;ONE TOUCH ULTRA TEST VI) strip; DAILY Starting Tue 2/11/2025, Disp-100 each, R-3, NormalONE TOUCH ULTRA 2 BRAND  -     Lancets MISC; DAILY Starting Tue 2/11/2025, Disp-100 each, R-3, Normal  4. Mild persistent asthma, uncomplicated  -     albuterol sulfate HFA (PROVENTIL;VENTOLIN;PROAIR) 108 (90 Base) MCG/ACT inhaler; Inhale 2 puffs into the lungs every 4 hours as needed for Wheezing or Shortness of Breath, Disp-18 g, R-2Normal  -     Albumin/Creatinine Ratio, Urine;

## 2025-03-16 ENCOUNTER — APPOINTMENT (OUTPATIENT)
Dept: GENERAL RADIOLOGY | Age: 50
End: 2025-03-16
Payer: COMMERCIAL

## 2025-03-16 ENCOUNTER — HOSPITAL ENCOUNTER (EMERGENCY)
Age: 50
Discharge: HOME | End: 2025-03-16
Payer: COMMERCIAL

## 2025-03-16 VITALS
HEART RATE: 110 BPM | BODY MASS INDEX: 46.65 KG/M2 | HEIGHT: 65 IN | SYSTOLIC BLOOD PRESSURE: 187 MMHG | RESPIRATION RATE: 22 BRPM | WEIGHT: 280 LBS | OXYGEN SATURATION: 94 % | TEMPERATURE: 98.5 F | DIASTOLIC BLOOD PRESSURE: 121 MMHG

## 2025-03-16 DIAGNOSIS — M25.552 LEFT HIP PAIN: Primary | ICD-10-CM

## 2025-03-16 PROCEDURE — 99284 EMERGENCY DEPT VISIT MOD MDM: CPT

## 2025-03-16 PROCEDURE — 6360000002 HC RX W HCPCS: Performed by: PHYSICIAN ASSISTANT

## 2025-03-16 PROCEDURE — 96372 THER/PROPH/DIAG INJ SC/IM: CPT

## 2025-03-16 PROCEDURE — 73502 X-RAY EXAM HIP UNI 2-3 VIEWS: CPT

## 2025-03-16 RX ORDER — CYCLOBENZAPRINE HCL 10 MG
10 TABLET ORAL 3 TIMES DAILY PRN
Qty: 21 TABLET | Refills: 0 | Status: SHIPPED | OUTPATIENT
Start: 2025-03-16 | End: 2025-03-26

## 2025-03-16 RX ORDER — HYDROMORPHONE HYDROCHLORIDE 1 MG/ML
1 INJECTION, SOLUTION INTRAMUSCULAR; INTRAVENOUS; SUBCUTANEOUS
Status: COMPLETED | OUTPATIENT
Start: 2025-03-16 | End: 2025-03-16

## 2025-03-16 RX ADMIN — HYDROMORPHONE HYDROCHLORIDE 1 MG: 1 INJECTION, SOLUTION INTRAMUSCULAR; INTRAVENOUS; SUBCUTANEOUS at 16:57

## 2025-03-16 ASSESSMENT — PAIN SCALES - GENERAL
PAINLEVEL_OUTOF10: 9
PAINLEVEL_OUTOF10: 7
PAINLEVEL_OUTOF10: 8

## 2025-03-16 ASSESSMENT — PAIN DESCRIPTION - DESCRIPTORS: DESCRIPTORS: ACHING

## 2025-03-16 ASSESSMENT — PAIN - FUNCTIONAL ASSESSMENT: PAIN_FUNCTIONAL_ASSESSMENT: 0-10

## 2025-03-16 ASSESSMENT — PAIN DESCRIPTION - ORIENTATION: ORIENTATION: LEFT

## 2025-03-16 ASSESSMENT — PAIN DESCRIPTION - LOCATION: LOCATION: HIP

## 2025-03-16 NOTE — ED NOTES
Patient mobility status  with mild difficulty.     I have reviewed discharge instructions with the patient.  The patient verbalized understanding.    Patient left ED via Discharge Method: ambulatory to Home with Friend.    Opportunity for questions and clarification provided.     Patient given 1 scripts.

## 2025-03-16 NOTE — ED PROVIDER NOTES
Emergency Department Provider Note       PCP: Yvonne Savage MD   Age: 49 y.o.   Sex: female     DISPOSITION Decision To Discharge 03/16/2025 05:30:06 PM   DISPOSITION CONDITION Stable            ICD-10-CM    1. Left hip pain  M25.552           Medical Decision Making     49-year-old female with left posterior hip pain for the past 5 to 6 days after turning over in bed.  Patient has no focal neurodeficits.  X-rays of the left hip and pelvis are negative for any obvious bony abnormality.  Patient has a history of diabetes and high blood pressure poorly controlled.  Will not give any steroids.  Patient given single dose of Dilaudid in the ER for pain we will give prescription for Flexeril to use 3 times a day along with warm compresses and Tylenol.  She is to call her primary care office tomorrow to arrange follow-up appoint patient is agreeable with plan     1 acute, uncomplicated illness or injury.  Prescription drug management performed.  Shared medical decision making was utilized in creating the patients health plan today.    I independently ordered and reviewed each unique test.  I reviewed external records: provider visit note from PCP.     I interpreted the X-rays left hip and pelvis.              History     49-year-old female to ER complaining of almost 1 week history of left posterior hip pain.  She denies any falls.  She states she feels like she rolled over in bed and injured it.  She has been basically resting for relief she denies any urinary symptoms no numbness or tingling to the leg she has not called her primary care physician for reach          ROS     Review of Systems   All other systems reviewed and are negative.       Physical Exam     Vitals signs and nursing note reviewed:  Vitals:    03/16/25 1540 03/16/25 1722 03/16/25 1727   BP: (!) 164/114  (!) 187/121   Pulse: 99 (!) 102 (!) 110   Resp: 22     Temp: 98.5 °F (36.9 °C)     SpO2: 94% 94% 94%   Weight: 127 kg (280 lb)     Height:

## 2025-03-16 NOTE — ED TRIAGE NOTES
Patient states of left hip pain x5 days. Denies injury. \"I think I rolled over on it wrong in my sleep.\"

## 2025-03-16 NOTE — DISCHARGE INSTRUCTIONS
Use muscle relaxer up to 3 times a day as needed for pain along with moist heat and Tylenol.  Call your primary care office tomorrow to arrange follow-up appointment

## 2025-03-17 DIAGNOSIS — J45.30 MILD PERSISTENT ASTHMA, UNCOMPLICATED: ICD-10-CM

## 2025-03-17 DIAGNOSIS — E66.01 MORBID (SEVERE) OBESITY DUE TO EXCESS CALORIES (HCC): ICD-10-CM

## 2025-03-17 DIAGNOSIS — E78.5 HYPERLIPIDEMIA, UNSPECIFIED HYPERLIPIDEMIA TYPE: ICD-10-CM

## 2025-03-17 DIAGNOSIS — M54.41 CHRONIC BILATERAL LOW BACK PAIN WITH RIGHT-SIDED SCIATICA: ICD-10-CM

## 2025-03-17 DIAGNOSIS — Z71.3 DIETARY COUNSELING AND SURVEILLANCE: ICD-10-CM

## 2025-03-17 DIAGNOSIS — E13.649 UNCONTROLLED DIABETES MELLITUS OF OTHER TYPE WITH HYPOGLYCEMIA, UNSPECIFIED HYPOGLYCEMIA COMA STATUS (HCC): ICD-10-CM

## 2025-03-17 DIAGNOSIS — G89.29 CHRONIC BILATERAL LOW BACK PAIN WITH RIGHT-SIDED SCIATICA: ICD-10-CM

## 2025-03-17 DIAGNOSIS — I10 PRIMARY HYPERTENSION: ICD-10-CM

## 2025-03-17 RX ORDER — BLOOD-GLUCOSE METER
EACH MISCELLANEOUS
OUTPATIENT
Start: 2025-03-17

## 2025-03-17 NOTE — TELEPHONE ENCOUNTER
It appears this was already sent into [facility]:       Order Providers    Prescribing Provider Encounter Provider   Yvonne Savage MD Ajaradder, Vidya, MD     Outpatient Medication Detail     Disp Refills Start End    Blood Glucose Monitoring Suppl (ONE TOUCH ULTRA 2) w/Device KIT 1 kit 0 2/11/2025 --    Sig - Route: 1 kit by Does not apply route daily - Does not apply    Sent to pharmacy as: OneTouch Ultra 2 w/Device Kit    E-Prescribing Status: Receipt confirmed by pharmacy (2/11/2025  2:32 PM EST)      Associated Diagnoses    Uncontrolled diabetes mellitus of other type with hypoglycemia, unspecified hypoglycemia coma status (HCC)  - Primary      Primary hypertension      Hyperlipidemia, unspecified hyperlipidemia type      Mild persistent asthma, uncomplicated      Chronic bilateral low back pain with right-sided sciatica      Morbid (severe) obesity due to excess calories      Dietary counseling and surveillance        Pharmacy    [id number] - [place], [place] - [address] -  [phone] -  [phone]

## 2025-06-18 ENCOUNTER — OFFICE VISIT (OUTPATIENT)
Dept: PRIMARY CARE CLINIC | Facility: CLINIC | Age: 50
End: 2025-06-18
Payer: COMMERCIAL

## 2025-06-18 VITALS
RESPIRATION RATE: 17 BRPM | TEMPERATURE: 97 F | HEART RATE: 100 BPM | HEIGHT: 65 IN | WEIGHT: 258 LBS | OXYGEN SATURATION: 90 % | DIASTOLIC BLOOD PRESSURE: 104 MMHG | BODY MASS INDEX: 42.99 KG/M2 | SYSTOLIC BLOOD PRESSURE: 164 MMHG

## 2025-06-18 DIAGNOSIS — G89.29 CHRONIC BILATERAL LOW BACK PAIN WITH RIGHT-SIDED SCIATICA: ICD-10-CM

## 2025-06-18 DIAGNOSIS — F19.91 HISTORY OF DRUG USE: ICD-10-CM

## 2025-06-18 DIAGNOSIS — J45.30 MILD PERSISTENT ASTHMA, UNCOMPLICATED: ICD-10-CM

## 2025-06-18 DIAGNOSIS — R10.9 RIGHT LATERAL ABDOMINAL PAIN: Primary | ICD-10-CM

## 2025-06-18 DIAGNOSIS — Z71.3 DIETARY COUNSELING AND SURVEILLANCE: ICD-10-CM

## 2025-06-18 DIAGNOSIS — M54.41 CHRONIC BILATERAL LOW BACK PAIN WITH RIGHT-SIDED SCIATICA: ICD-10-CM

## 2025-06-18 DIAGNOSIS — I10 PRIMARY HYPERTENSION: ICD-10-CM

## 2025-06-18 DIAGNOSIS — E66.01 MORBID (SEVERE) OBESITY DUE TO EXCESS CALORIES (HCC): ICD-10-CM

## 2025-06-18 DIAGNOSIS — E13.649 UNCONTROLLED DIABETES MELLITUS OF OTHER TYPE WITH HYPOGLYCEMIA, UNSPECIFIED HYPOGLYCEMIA COMA STATUS (HCC): ICD-10-CM

## 2025-06-18 DIAGNOSIS — E78.5 HYPERLIPIDEMIA, UNSPECIFIED HYPERLIPIDEMIA TYPE: ICD-10-CM

## 2025-06-18 DIAGNOSIS — Z12.11 SCREENING FOR COLON CANCER: ICD-10-CM

## 2025-06-18 PROCEDURE — 3077F SYST BP >= 140 MM HG: CPT | Performed by: FAMILY MEDICINE

## 2025-06-18 PROCEDURE — 3080F DIAST BP >= 90 MM HG: CPT | Performed by: FAMILY MEDICINE

## 2025-06-18 PROCEDURE — 99214 OFFICE O/P EST MOD 30 MIN: CPT | Performed by: FAMILY MEDICINE

## 2025-06-18 RX ORDER — ATORVASTATIN CALCIUM 10 MG/1
10 TABLET, FILM COATED ORAL DAILY
Qty: 90 TABLET | Refills: 0 | Status: SHIPPED | OUTPATIENT
Start: 2025-06-18

## 2025-06-18 RX ORDER — LISINOPRIL 20 MG/1
20 TABLET ORAL DAILY
Qty: 90 TABLET | Refills: 0 | Status: SHIPPED | OUTPATIENT
Start: 2025-06-18

## 2025-06-18 RX ORDER — METFORMIN HYDROCHLORIDE 500 MG/1
1000 TABLET, EXTENDED RELEASE ORAL DAILY
Qty: 180 TABLET | Refills: 0 | Status: SHIPPED | OUTPATIENT
Start: 2025-06-18

## 2025-06-18 RX ORDER — INSULIN GLARGINE 100 [IU]/ML
45 INJECTION, SOLUTION SUBCUTANEOUS NIGHTLY
Qty: 40.5 ML | Refills: 0 | Status: SHIPPED | OUTPATIENT
Start: 2025-06-18 | End: 2025-09-16

## 2025-06-18 NOTE — PROGRESS NOTES
TempSrc: Temporal    SpO2: 90%    Weight: 117 kg (258 lb)    Height: 1.651 m (5' 5\")         Physical Exam   Constitutional: She is oriented to person, place, and time. She appears well-developed.   Obese     HENT:   Head: Normocephalic and atraumatic.   Right Ear: External ear normal.   Left Ear: External ear normal.   Nose: Nose normal.   Mouth/Throat: Oropharynx is clear and moist.   Eyes:  Neck: Normal range of motion. Neck supple.   Cardiovascular: regular and rhythmic  Pulmonary/Chest: clear  Abdominal:soft and nontender  Right lateral chest wall ans abdominal wall tendenress    Musculoskeletal: Normal range of motion.     Neurological: She is alert and oriented to person, place, and time.   Skin: Skin is warm and dry.   Psychiatric: She has a normal mood and affect. Her behavior is normal. Judgment and thought content normal.         .RESULTRCNTNC(15W    ASSESSMENT/PLAN:    1. Right lateral abdominal pain  Comments:  CT abdomen/cHest neg  HX OF DRUG USE  Orders:  -     Hemoglobin A1C; Future  -     Lipid Panel; Future  -     Albumin/Creatinine Ratio, Urine; Future  -     Comprehensive Metabolic Panel; Future  2. Uncontrolled diabetes mellitus of other type with hypoglycemia, unspecified hypoglycemia coma status (HCC)  Overview:  increase metformin to 100 mg daily  +  pt to follow better low crb diet  exercise daily  f/u in 2 months for recheck  12/2023  Metformin  Insulin increased to 15 units today and increase 2 units every week until BS averages to 150  2/2024  Increased insulin to 25 units  2/2925  On metformin   Pt refuses to take OZEMPIC  Refuses to take additional meds  Want to take more insulin  Insulin increased to 35 units  Orders:  -     insulin glargine (LANTUS SOLOSTAR) 100 UNIT/ML injection pen; Inject 45 Units into the skin nightly, Disp-40.5 mL, R-0Normal  -     metFORMIN (GLUCOPHAGE-XR) 500 MG extended release tablet; Take 2 tablets by mouth daily Dose increased to 1000 mg on 11/3/22,

## 2025-07-10 ENCOUNTER — COMMUNITY OUTREACH (OUTPATIENT)
Dept: PRIMARY CARE CLINIC | Facility: CLINIC | Age: 50
End: 2025-07-10

## 2025-07-10 NOTE — PROGRESS NOTES
Patient's HM shows they are overdue for A1C & Colorectal Screening.   Care Everywhere and  files searched.  No results to attach to order nor HM updated.